# Patient Record
Sex: MALE | Race: OTHER | Employment: UNEMPLOYED | URBAN - METROPOLITAN AREA
[De-identification: names, ages, dates, MRNs, and addresses within clinical notes are randomized per-mention and may not be internally consistent; named-entity substitution may affect disease eponyms.]

---

## 2017-02-12 ENCOUNTER — HOSPITAL ENCOUNTER (EMERGENCY)
Facility: HOSPITAL | Age: 42
Discharge: HOME | End: 2017-02-12
Attending: EMERGENCY MEDICINE
Payer: MEDICAID

## 2017-02-12 ENCOUNTER — APPOINTMENT (OUTPATIENT)
Dept: CT IMAGING | Facility: HOSPITAL | Age: 42
End: 2017-02-12
Attending: EMERGENCY MEDICINE
Payer: MEDICAID

## 2017-02-12 VITALS
SYSTOLIC BLOOD PRESSURE: 121 MMHG | OXYGEN SATURATION: 99 % | RESPIRATION RATE: 17 BRPM | BODY MASS INDEX: 22.76 KG/M2 | WEIGHT: 145 LBS | HEIGHT: 67 IN | TEMPERATURE: 99 F | DIASTOLIC BLOOD PRESSURE: 76 MMHG | HEART RATE: 94 BPM

## 2017-02-12 DIAGNOSIS — S01.81XA FOREHEAD LACERATION, INITIAL ENCOUNTER: Primary | ICD-10-CM

## 2017-02-12 PROCEDURE — 70450 CT HEAD/BRAIN W/O DYE: CPT

## 2017-02-12 PROCEDURE — 99284 EMERGENCY DEPT VISIT MOD MDM: CPT

## 2017-02-12 PROCEDURE — 12014 RPR F/E/E/N/L/M 5.1-7.5 CM: CPT

## 2017-02-12 RX ORDER — ACETAMINOPHEN 325 MG/1
650 TABLET ORAL ONCE
Status: COMPLETED | OUTPATIENT
Start: 2017-02-12 | End: 2017-02-12

## 2017-02-12 NOTE — ED NOTES
Pt alert and oriented answering all questions appropriately.  Pt updated on plan of care regarding awaiting Ct results
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
## 2017-02-12 NOTE — ED NOTES
Bacitracin, non adaptic and gauze head wrap applied to patient forehead. Patient awaiting ride from friend.  Pt walks with steady gait, denies dizziness

## 2017-09-13 ENCOUNTER — OFFICE VISIT (OUTPATIENT)
Dept: FAMILY MEDICINE CLINIC | Facility: CLINIC | Age: 42
End: 2017-09-13

## 2017-09-13 ENCOUNTER — HOSPITAL ENCOUNTER (OUTPATIENT)
Age: 42
Discharge: HOME | End: 2017-09-13
Payer: MEDICAID

## 2017-09-13 VITALS
OXYGEN SATURATION: 98 % | HEART RATE: 108 BPM | BODY MASS INDEX: 27.47 KG/M2 | HEIGHT: 67 IN | RESPIRATION RATE: 18 BRPM | SYSTOLIC BLOOD PRESSURE: 124 MMHG | WEIGHT: 175 LBS | DIASTOLIC BLOOD PRESSURE: 70 MMHG | TEMPERATURE: 98 F

## 2017-09-13 VITALS
RESPIRATION RATE: 16 BRPM | SYSTOLIC BLOOD PRESSURE: 134 MMHG | DIASTOLIC BLOOD PRESSURE: 88 MMHG | HEART RATE: 94 BPM | OXYGEN SATURATION: 99 %

## 2017-09-13 DIAGNOSIS — H10.32 ACUTE CONJUNCTIVITIS OF LEFT EYE, UNSPECIFIED ACUTE CONJUNCTIVITIS TYPE: ICD-10-CM

## 2017-09-13 DIAGNOSIS — H02.846 EDEMA OF LEFT EYELID: ICD-10-CM

## 2017-09-13 DIAGNOSIS — Z02.9 ENCOUNTERS FOR ADMINISTRATIVE PURPOSE: Primary | ICD-10-CM

## 2017-09-13 DIAGNOSIS — H57.12 ACUTE LEFT EYE PAIN: Primary | ICD-10-CM

## 2017-09-13 PROCEDURE — 99213 OFFICE O/P EST LOW 20 MIN: CPT

## 2017-09-13 PROCEDURE — 99214 OFFICE O/P EST MOD 30 MIN: CPT

## 2017-09-13 RX ORDER — TETRACAINE HYDROCHLORIDE 5 MG/ML
2 SOLUTION OPHTHALMIC ONCE
Status: COMPLETED | OUTPATIENT
Start: 2017-09-13 | End: 2017-09-13

## 2017-09-13 RX ORDER — OLOPATADINE HYDROCHLORIDE 1 MG/ML
1 SOLUTION/ DROPS OPHTHALMIC 2 TIMES DAILY
Qty: 1 BOTTLE | Refills: 0 | Status: SHIPPED | OUTPATIENT
Start: 2017-09-13 | End: 2017-09-20

## 2017-09-13 RX ORDER — CIPROFLOXACIN HYDROCHLORIDE 3.5 MG/ML
2 SOLUTION/ DROPS TOPICAL
Qty: 1 BOTTLE | Refills: 0 | Status: SHIPPED | OUTPATIENT
Start: 2017-09-13 | End: 2017-09-23

## 2017-09-13 NOTE — PROGRESS NOTES
Pt presents to [facility] for left eye pain. Pt states it started about 2 days ago. Pt states \"it feels swollen and hurts. \" pt states is unsure if something is in his eye. I informed pt that this is beyond my scope in [facility].  I attempted to get pt in with Dr. Yoshi Westbrook

## 2017-09-13 NOTE — ED PROVIDER NOTES
Patient Seen in: [address] [facility]    History   Patient presents with:  Eye Problem    Stated Complaint: flash of light when blinking/left eye pain swelling and red    HPI    Chio Webber is a 41yr old male who comes in from the [facility] complaining of Appearance: Alert, cooperative, no SOB or labored breathing, appropriate for age   Head: Normocephalic, without obvious abnormality   Eyes: EOMI without nystagmus. PERRLA. Left eye: mild conjunctival injection. no matting. No obvious foreign body.  No surro these medications    ciprofloxacin HCl 0.3 % Ophthalmic Solution  Place 2 drops into the left eye every 4 (four) hours while awake. Qty: 1 Bottle Refills: 0    Olopatadine HCl 0.1 % Ophthalmic Solution  Place 1 drop into the left eye 2 (two) times daily.

## 2017-09-13 NOTE — ED INITIAL ASSESSMENT (HPI)
Pt sts redness and swelling to left upper eyelid and constant throbbing pain to left eye for the past 2 days. Unable to focus clearly. Constant presence of a flashing white [place] in vision when eye is closed.  Swelling had increased this morning but has baca

## 2017-09-29 ENCOUNTER — HOSPITAL ENCOUNTER (OUTPATIENT)
Age: 42
Discharge: HOME | End: 2017-09-29
Payer: MEDICAID

## 2017-09-29 ENCOUNTER — OFFICE VISIT (OUTPATIENT)
Dept: FAMILY MEDICINE CLINIC | Facility: CLINIC | Age: 42
End: 2017-09-29

## 2017-09-29 VITALS
RESPIRATION RATE: 16 BRPM | HEART RATE: 80 BPM | HEIGHT: 66 IN | SYSTOLIC BLOOD PRESSURE: 128 MMHG | DIASTOLIC BLOOD PRESSURE: 85 MMHG | TEMPERATURE: 98 F | OXYGEN SATURATION: 99 % | BODY MASS INDEX: 28.93 KG/M2 | WEIGHT: 180 LBS

## 2017-09-29 DIAGNOSIS — L05.91 CYST NEAR COCCYX: Primary | ICD-10-CM

## 2017-09-29 DIAGNOSIS — L05.01 PILONIDAL CYST WITH ABSCESS: Primary | ICD-10-CM

## 2017-09-29 PROCEDURE — 99213 OFFICE O/P EST LOW 20 MIN: CPT

## 2017-09-29 PROCEDURE — 10080 I&D PILONIDAL CYST SIMPLE: CPT

## 2017-09-29 PROCEDURE — 99214 OFFICE O/P EST MOD 30 MIN: CPT

## 2017-09-29 RX ORDER — SULFAMETHOXAZOLE AND TRIMETHOPRIM 800; 160 MG/1; MG/1
1 TABLET ORAL 2 TIMES DAILY
Qty: 14 TABLET | Refills: 0 | Status: SHIPPED | OUTPATIENT
Start: 2017-09-29 | End: 2017-10-06

## 2017-09-29 NOTE — ED PROVIDER NOTES
Patient Seen in: [address]    History   Patient presents with:  Bump    Stated Complaint: was sent from walkin clinic -cyst     HPI    Patient is a pleasant 82-year-old male.   Patient has a pertinent medical history for stage I HPI.  Constitutional and vital signs reviewed. All other systems reviewed and negative except as noted above. PSFH elements reviewed from today and agreed except as otherwise stated in HPI.     Physical Exam   ED Triage Vitals [09/29/17 1456]  BP: [address] [address]  [phone]            Medications Prescribed:  Current Discharge Medication List    START taking these medications    Sulfamethoxazole-TMP -160 MG Oral Tab per tablet  Take 1 tablet by mouth 2 (two) times daily.   Qty: 14 tablet Refills: 0

## 2017-09-29 NOTE — ED INITIAL ASSESSMENT (HPI)
Bump between buttocks x 2 days. Denies fever. Painful. Pt has h/o abscess 2 yrs ago.  Took ibuprofen 2 tab 11 am.

## 2017-09-29 NOTE — PROGRESS NOTES
Pt presents to [facility] for painful bump above tailbone.   Pt c/o pain with sitting, walking  Pt referred to [facility] to further eval and treat possible pilonidal cyst  Attempted to contact [facility] to inform of transfer, no answer  Pt is stable upon d/c from [place]

## 2017-09-30 ENCOUNTER — HOSPITAL ENCOUNTER (EMERGENCY)
Facility: HOSPITAL | Age: 42
Discharge: HOME | End: 2017-09-30
Attending: EMERGENCY MEDICINE
Payer: MEDICAID

## 2017-09-30 VITALS
SYSTOLIC BLOOD PRESSURE: 113 MMHG | HEART RATE: 75 BPM | HEIGHT: 66 IN | TEMPERATURE: 98 F | BODY MASS INDEX: 28.93 KG/M2 | OXYGEN SATURATION: 100 % | DIASTOLIC BLOOD PRESSURE: 89 MMHG | WEIGHT: 180 LBS | RESPIRATION RATE: 18 BRPM

## 2017-09-30 DIAGNOSIS — L05.01 PILONIDAL CYST WITH ABSCESS: Primary | ICD-10-CM

## 2017-09-30 PROCEDURE — 10061 I&D ABSCESS COMP/MULTIPLE: CPT

## 2017-09-30 PROCEDURE — 10081 I&D PILONIDAL CYST COMP: CPT

## 2017-09-30 PROCEDURE — 99283 EMERGENCY DEPT VISIT LOW MDM: CPT

## 2017-09-30 PROCEDURE — 96372 THER/PROPH/DIAG INJ SC/IM: CPT

## 2017-09-30 RX ORDER — KETOROLAC TROMETHAMINE 30 MG/ML
30 INJECTION, SOLUTION INTRAMUSCULAR; INTRAVENOUS ONCE
Status: COMPLETED | OUTPATIENT
Start: 2017-09-30 | End: 2017-09-30

## 2017-09-30 RX ORDER — HYDROCODONE BITARTRATE AND ACETAMINOPHEN 5; 325 MG/1; MG/1
1-2 TABLET ORAL EVERY 4 HOURS PRN
Qty: 20 TABLET | Refills: 0 | Status: SHIPPED | OUTPATIENT
Start: 2017-09-30 | End: 2017-10-07

## 2017-09-30 NOTE — ED PROVIDER NOTES
Patient Seen in: [facility] Emergency Department    History   Patient presents with:  Abscess (integumentary)    Stated Complaint: abscess     HPI    79-year-old male who was seen in the immediate care center yesterday and had a pilonidal abscess  patient has a 1.5 cm abscess that is fluctuant and tender  Extremities: no edema, normal peripheral pulses   Neuro: Alert oriented and nonfocal     ED Course   Labs Reviewed - No data to display    ==========================================================

## 2017-09-30 NOTE — ED INITIAL ASSESSMENT (HPI)
Here for a re-evaluation of a pilonidal cyst. Patient said he was seen at an immediate care center yesterday. Had the cyst lanced. Today, patient said the cyst got bigger, but is now normal after he showered today. Denies fevers.

## 2017-10-02 ENCOUNTER — APPOINTMENT (OUTPATIENT)
Dept: ULTRASOUND IMAGING | Facility: HOSPITAL | Age: 42
End: 2017-10-02
Attending: EMERGENCY MEDICINE
Payer: MEDICAID

## 2017-10-02 ENCOUNTER — HOSPITAL ENCOUNTER (EMERGENCY)
Facility: HOSPITAL | Age: 42
Discharge: HOME | End: 2017-10-03
Attending: EMERGENCY MEDICINE
Payer: MEDICAID

## 2017-10-02 VITALS
DIASTOLIC BLOOD PRESSURE: 74 MMHG | HEIGHT: 66 IN | TEMPERATURE: 98 F | BODY MASS INDEX: 28.93 KG/M2 | WEIGHT: 180 LBS | OXYGEN SATURATION: 99 % | HEART RATE: 75 BPM | RESPIRATION RATE: 16 BRPM | SYSTOLIC BLOOD PRESSURE: 128 MMHG

## 2017-10-02 DIAGNOSIS — L05.01 PILONIDAL CYST WITH ABSCESS: Primary | ICD-10-CM

## 2017-10-02 PROCEDURE — 76705 ECHO EXAM OF ABDOMEN: CPT | Performed by: EMERGENCY MEDICINE

## 2017-10-02 PROCEDURE — 99285 EMERGENCY DEPT VISIT HI MDM: CPT

## 2017-10-02 PROCEDURE — 10081 I&D PILONIDAL CYST COMP: CPT

## 2017-10-02 PROCEDURE — 99284 EMERGENCY DEPT VISIT MOD MDM: CPT

## 2017-10-02 PROCEDURE — 96372 THER/PROPH/DIAG INJ SC/IM: CPT

## 2017-10-02 RX ORDER — KETOROLAC TROMETHAMINE 30 MG/ML
30 INJECTION, SOLUTION INTRAMUSCULAR; INTRAVENOUS ONCE
Status: COMPLETED | OUTPATIENT
Start: 2017-10-02 | End: 2017-10-02

## 2017-10-02 RX ORDER — IBUPROFEN 800 MG/1
600 TABLET ORAL EVERY 6 HOURS PRN
COMMUNITY

## 2017-10-03 RX ORDER — HYDROCODONE BITARTRATE AND ACETAMINOPHEN 5; 325 MG/1; MG/1
1-2 TABLET ORAL EVERY 4 HOURS PRN
Qty: 12 TABLET | Refills: 0 | Status: SHIPPED | OUTPATIENT
Start: 2017-10-03 | End: 2018-10-17 | Stop reason: ALTCHOICE

## 2017-10-03 NOTE — ED INITIAL ASSESSMENT (HPI)
Pt had pilonidal cyst drained at [facility] on Friday, seen here on Saturday for additional I&D; pt here with more drainage and bleeding

## 2017-10-03 NOTE — ED PROVIDER NOTES
Patient Seen in: [facility] Emergency Department    History   Patient presents with:  Abscess (integumentary)    Stated Complaint: PILONIDAL CYST    CHARU Patterson is a 42-year-old male who presents today for evaluation of continued pain with a pilonid Packs/day: 0.50      Years: 0.00         Types: Cigarettes  Smokeless tobacco: Never Used                          Review of Systems    Positive for stated complaint: PILONIDAL CYST  Other systems are as noted in HPI.   Constitutiona (primary encounter diagnosis)    Disposition:  There is no disposition on file for this visit. Follow-up:  No follow-up provider specified.     Medications Prescribed:  Current Discharge Medication List

## 2018-04-23 ENCOUNTER — HOSPITAL ENCOUNTER (EMERGENCY)
Facility: HOSPITAL | Age: 43
Discharge: HOME | End: 2018-04-23
Attending: EMERGENCY MEDICINE
Payer: MEDICAID

## 2018-04-23 VITALS
HEIGHT: 65 IN | SYSTOLIC BLOOD PRESSURE: 131 MMHG | TEMPERATURE: 98 F | RESPIRATION RATE: 18 BRPM | WEIGHT: 215 LBS | DIASTOLIC BLOOD PRESSURE: 78 MMHG | OXYGEN SATURATION: 98 % | HEART RATE: 116 BPM | BODY MASS INDEX: 35.82 KG/M2

## 2018-04-23 DIAGNOSIS — S61.214A LACERATION OF RIGHT RING FINGER WITHOUT FOREIGN BODY, NAIL DAMAGE STATUS UNSPECIFIED, INITIAL ENCOUNTER: Primary | ICD-10-CM

## 2018-04-23 PROCEDURE — 12001 RPR S/N/AX/GEN/TRNK 2.5CM/<: CPT

## 2018-04-23 PROCEDURE — 99283 EMERGENCY DEPT VISIT LOW MDM: CPT

## 2018-04-23 PROCEDURE — 90471 IMMUNIZATION ADMIN: CPT

## 2018-04-23 NOTE — ED PROVIDER NOTES
Patient Seen in: [facility] Emergency Department    History   Patient presents with:  Laceration Abrasion (integumentary)    Stated Complaint: FINGER LAC    HPI    Patient is a 53-year-old male comes in emergency room for evaluation of right fourth noted.  No bleeding or discharge    ED Course   Labs Reviewed - No data to display    ED Course as of Apr 23 0531  ------------------------------------------------------------  Patient underwent wound a sepsis. Patient had Dermaflex placed.   Tetanus shot
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
## 2018-10-17 ENCOUNTER — HOSPITAL ENCOUNTER (EMERGENCY)
Facility: HOSPITAL | Age: 43
Discharge: HOME | End: 2018-10-17
Attending: EMERGENCY MEDICINE
Payer: MEDICAID

## 2018-10-17 VITALS
DIASTOLIC BLOOD PRESSURE: 95 MMHG | WEIGHT: 210 LBS | OXYGEN SATURATION: 100 % | SYSTOLIC BLOOD PRESSURE: 112 MMHG | HEART RATE: 95 BPM | RESPIRATION RATE: 17 BRPM | HEIGHT: 66 IN | TEMPERATURE: 98 F | BODY MASS INDEX: 33.75 KG/M2

## 2018-10-17 DIAGNOSIS — L05.01 PILONIDAL CYST WITH ABSCESS: Primary | ICD-10-CM

## 2018-10-17 PROCEDURE — 10061 I&D ABSCESS COMP/MULTIPLE: CPT

## 2018-10-17 PROCEDURE — 99283 EMERGENCY DEPT VISIT LOW MDM: CPT

## 2018-10-17 PROCEDURE — 87205 SMEAR GRAM STAIN: CPT | Performed by: NURSE PRACTITIONER

## 2018-10-17 PROCEDURE — 87070 CULTURE OTHR SPECIMN AEROBIC: CPT | Performed by: NURSE PRACTITIONER

## 2018-10-17 RX ORDER — IBUPROFEN 600 MG/1
600 TABLET ORAL EVERY 8 HOURS PRN
Qty: 30 TABLET | Refills: 0 | Status: SHIPPED | OUTPATIENT
Start: 2018-10-17 | End: 2018-10-24

## 2018-10-17 RX ORDER — HYDROCODONE BITARTRATE AND ACETAMINOPHEN 5; 325 MG/1; MG/1
2 TABLET ORAL ONCE
Status: COMPLETED | OUTPATIENT
Start: 2018-10-17 | End: 2018-10-17

## 2018-10-17 NOTE — ED INITIAL ASSESSMENT (HPI)
Pt c/o possible pilonidal cyst - reports was working on his car and started have pain by his beltline

## 2018-10-18 NOTE — ED PROVIDER NOTES
Patient Seen in: [facility] Emergency Department    History   Patient presents with:  Abscess (integumentary)    Stated Complaint: abscess     HPI  Patient is a 44-year old gentleman who presents with pilonidal cyst.  Patient fell down 3 wooden step Mouth/Throat: Oropharynx is clear and moist.   Eyes: Conjunctivae are normal.   Pulmonary/Chest: Effort normal.   Abdominal: Soft. Bowel sounds are normal. He exhibits no distension and no mass. There is no tenderness. There is no rebound and no guarding. Discussed with patient that he needs to apply warm compresses to this area to utilize sitz baths.   Also advised patient to leave packing in place and to have follow-up in 2 days for packing removal.  Discussed with patient that he needs to observe wound ar

## 2018-10-20 ENCOUNTER — HOSPITAL ENCOUNTER (EMERGENCY)
Facility: HOSPITAL | Age: 43
Discharge: HOME | End: 2018-10-20
Attending: EMERGENCY MEDICINE
Payer: MEDICAID

## 2018-10-20 VITALS
BODY MASS INDEX: 22.49 KG/M2 | HEIGHT: 65 IN | DIASTOLIC BLOOD PRESSURE: 90 MMHG | WEIGHT: 135 LBS | OXYGEN SATURATION: 99 % | RESPIRATION RATE: 18 BRPM | SYSTOLIC BLOOD PRESSURE: 135 MMHG | TEMPERATURE: 98 F | HEART RATE: 88 BPM

## 2018-10-20 DIAGNOSIS — L03.317 CELLULITIS OF BUTTOCK: ICD-10-CM

## 2018-10-20 DIAGNOSIS — L05.01 PILONIDAL CYST WITH ABSCESS: Primary | ICD-10-CM

## 2018-10-20 PROCEDURE — 99283 EMERGENCY DEPT VISIT LOW MDM: CPT

## 2018-10-20 RX ORDER — SULFAMETHOXAZOLE AND TRIMETHOPRIM 800; 160 MG/1; MG/1
1 TABLET ORAL 2 TIMES DAILY
Qty: 14 TABLET | Refills: 0 | Status: SHIPPED | OUTPATIENT
Start: 2018-10-20 | End: 2018-10-27

## 2018-10-20 RX ORDER — IBUPROFEN 600 MG/1
600 TABLET ORAL EVERY 8 HOURS PRN
Qty: 30 TABLET | Refills: 0 | Status: SHIPPED | OUTPATIENT
Start: 2018-10-20 | End: 2018-10-27

## 2018-10-20 NOTE — ED PROVIDER NOTES
Patient Seen in: [facility] Emergency Department    History   Patient presents with:  Cellulitis (integumentary, infectious)    Stated Complaint:     HPI    44-year-old male presents emergency room for wound check.   Patient had incision and drainage Regular rate and rhythm, no murmurs. LUNGS: Clear to auscultation bilaterally. No Rales, no rhonchi, no wheezing, no stridor.   ABDOMEN: Soft, nondistended,non tender  Buttock: Pilonidal abscess status post drainage with central incision without fluctuanc DO  [address]    Schedule an appointment as soon as possible for a visit in 2 days          Medications Prescribed:  Current Discharge Medication List    START taking these medications    Sulfametho

## 2019-05-28 ENCOUNTER — APPOINTMENT (OUTPATIENT)
Dept: CT IMAGING | Facility: HOSPITAL | Age: 44
End: 2019-05-28
Attending: EMERGENCY MEDICINE
Payer: MEDICAID

## 2019-05-28 ENCOUNTER — HOSPITAL ENCOUNTER (EMERGENCY)
Facility: HOSPITAL | Age: 44
Discharge: HOME | End: 2019-05-28
Attending: EMERGENCY MEDICINE
Payer: MEDICAID

## 2019-05-28 VITALS
DIASTOLIC BLOOD PRESSURE: 88 MMHG | OXYGEN SATURATION: 98 % | HEIGHT: 66 IN | WEIGHT: 176 LBS | TEMPERATURE: 98 F | SYSTOLIC BLOOD PRESSURE: 120 MMHG | HEART RATE: 80 BPM | RESPIRATION RATE: 16 BRPM | BODY MASS INDEX: 28.28 KG/M2

## 2019-05-28 DIAGNOSIS — S31.109S CHRONIC ABDOMINAL WOUND INFECTION, SEQUELA: Primary | ICD-10-CM

## 2019-05-28 DIAGNOSIS — L08.9 CHRONIC ABDOMINAL WOUND INFECTION, SEQUELA: Primary | ICD-10-CM

## 2019-05-28 PROCEDURE — 87070 CULTURE OTHR SPECIMN AEROBIC: CPT | Performed by: EMERGENCY MEDICINE

## 2019-05-28 PROCEDURE — 80053 COMPREHEN METABOLIC PANEL: CPT | Performed by: EMERGENCY MEDICINE

## 2019-05-28 PROCEDURE — 87205 SMEAR GRAM STAIN: CPT | Performed by: EMERGENCY MEDICINE

## 2019-05-28 PROCEDURE — 99284 EMERGENCY DEPT VISIT MOD MDM: CPT

## 2019-05-28 PROCEDURE — 74177 CT ABD & PELVIS W/CONTRAST: CPT | Performed by: EMERGENCY MEDICINE

## 2019-05-28 PROCEDURE — 96361 HYDRATE IV INFUSION ADD-ON: CPT

## 2019-05-28 PROCEDURE — 96360 HYDRATION IV INFUSION INIT: CPT

## 2019-05-28 PROCEDURE — 87186 SC STD MICRODIL/AGAR DIL: CPT | Performed by: EMERGENCY MEDICINE

## 2019-05-28 PROCEDURE — 85025 COMPLETE CBC W/AUTO DIFF WBC: CPT | Performed by: EMERGENCY MEDICINE

## 2019-05-28 PROCEDURE — 87077 CULTURE AEROBIC IDENTIFY: CPT | Performed by: EMERGENCY MEDICINE

## 2019-05-28 RX ORDER — CYCLOBENZAPRINE HCL 10 MG
10 TABLET ORAL 3 TIMES DAILY PRN
COMMUNITY

## 2019-05-28 RX ORDER — SODIUM CHLORIDE 9 MG/ML
INJECTION, SOLUTION INTRAVENOUS ONCE
Status: COMPLETED | OUTPATIENT
Start: 2019-05-28 | End: 2019-05-28

## 2019-05-28 RX ORDER — DOXYCYCLINE HYCLATE 100 MG/1
100 CAPSULE ORAL 2 TIMES DAILY
COMMUNITY

## 2019-05-28 RX ORDER — TRAMADOL HYDROCHLORIDE 50 MG/1
TABLET ORAL EVERY 6 HOURS PRN
Qty: 15 TABLET | Refills: 0 | Status: SHIPPED | OUTPATIENT
Start: 2019-05-28

## 2019-05-28 NOTE — ED PROVIDER NOTES
Patient Seen in: [facility] Emergency Department    History   Patient presents with:  Abdomen/Flank Pain (GI/)    Stated Complaint: abdominal pain, recent surgery with wound draining    HPI    Patient is a 57-year-old male, presenting for evaluati Current:/74   Pulse 87   Temp 98 °F (36.7 °C) (Temporal)   Resp 20   Ht 167.6 cm (5' 6\")   Wt 79.8 kg   SpO2 96%   BMI 28.41 kg/m²       Physical Exam   Constitutional: He is oriented to person, place, and time.  He appears well-developed and well-no PROCEDURE:  CT ABDOMEN PELVIS IV CONTRAST, NO ORAL (ER)  COMPARISON:  [facility] , CT ABDOMEN PELVIS IV CONTRAST, NO ORAL (ER), 8/05/2016, 0:35.   INDICATIONS:  abdominal pain, recent surgery with wound draining  TECHNIQUE:  CT scanning was performed from the d CONCLUSION:  Postsurgical changes noted within the anterior abdominal wall from prior hernia repair surgery with soft tissue thickening and a small amount of fluid noted measuring 2.2 x 0.5 x 1. 6 cm likely a postoperative seroma although infectious etiolo

## 2019-05-28 NOTE — ED INITIAL ASSESSMENT (HPI)
Repair of abdominal mesh 3/20 with hernias. 3/25 had additional repair. Abscess developed post op. Open area noted 5/22. C/O ongoing pain to incision site, upper abdomen and it radiates into right flank.   Pt also reports that he fell onto his right aimee

## 2021-05-08 ENCOUNTER — HOSPITAL ENCOUNTER (EMERGENCY)
Facility: HOSPITAL | Age: 46
Discharge: HOME | End: 2021-05-08
Attending: EMERGENCY MEDICINE
Payer: MEDICAID

## 2021-05-08 VITALS
RESPIRATION RATE: 18 BRPM | DIASTOLIC BLOOD PRESSURE: 96 MMHG | HEART RATE: 100 BPM | OXYGEN SATURATION: 97 % | WEIGHT: 178 LBS | TEMPERATURE: 99 F | HEIGHT: 66 IN | SYSTOLIC BLOOD PRESSURE: 131 MMHG | BODY MASS INDEX: 28.61 KG/M2

## 2021-05-08 DIAGNOSIS — T50.B95A ADVERSE REACTION TO COVID-19 VACCINE: ICD-10-CM
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
## 2021-05-08 PROCEDURE — 99283 EMERGENCY DEPT VISIT LOW MDM: CPT

## 2021-05-08 RX ORDER — ONDANSETRON 4 MG/1
4 TABLET, ORALLY DISINTEGRATING ORAL EVERY 4 HOURS PRN
Qty: 10 TABLET | Refills: 0 | Status: SHIPPED | OUTPATIENT
Start: 2021-05-08 | End: 2021-05-15

## 2021-05-08 NOTE — ED PROVIDER NOTES
Patient Seen in: [facility] Emergency Department      History   Patient presents with:   Allergic Rxn Allergies    Stated Complaint: itching    HPI/Subjective:   HPI    Patient is a 26-year-old gentleman who presents the emergency room with several c Current:BP (!) 131/96   Pulse 100   Temp 98.7 °F (37.1 °C) (Temporal)   Resp 18   Ht 167.6 cm (5' 6\")   Wt 80.7 kg   SpO2 97%   BMI 28.73 kg/m²         Physical Exam    General: Well-appearing patient of stated age resting comfortably  HEENT: Norm[place]

## 2021-05-24 NOTE — ED INITIAL ASSESSMENT (HPI)
Patient was seen here on the 17th of October for a pilonidal cyst he went home instructed to take ibuprofen.  Returns today because the abscess is red itching and oozing blood None

## 2022-11-16 ENCOUNTER — OFFICE VISIT (OUTPATIENT)
Dept: NEUROLOGY | Facility: CLINIC | Age: 47
End: 2022-11-16
Payer: MEDICAID

## 2022-11-16 ENCOUNTER — TELEPHONE (OUTPATIENT)
Dept: NEUROLOGY | Facility: CLINIC | Age: 47
End: 2022-11-16

## 2022-11-16 ENCOUNTER — TELEPHONE (OUTPATIENT)
Dept: SURGERY | Facility: CLINIC | Age: 47
End: 2022-11-16

## 2022-11-16 VITALS
BODY MASS INDEX: 29 KG/M2 | HEART RATE: 82 BPM | DIASTOLIC BLOOD PRESSURE: 72 MMHG | SYSTOLIC BLOOD PRESSURE: 130 MMHG | WEIGHT: 178 LBS | RESPIRATION RATE: 16 BRPM

## 2022-11-16 DIAGNOSIS — G35 MS (MULTIPLE SCLEROSIS) (HCC): Primary | ICD-10-CM

## 2022-11-16 DIAGNOSIS — R26.0 ATAXIC GAIT: ICD-10-CM

## 2022-11-16 DIAGNOSIS — R20.0 NUMBNESS AND TINGLING OF BOTH LOWER EXTREMITIES: ICD-10-CM

## 2022-11-16 DIAGNOSIS — R20.2 NUMBNESS AND TINGLING OF BOTH UPPER EXTREMITIES: ICD-10-CM

## 2022-11-16 DIAGNOSIS — R20.2 NUMBNESS AND TINGLING OF BOTH LOWER EXTREMITIES: ICD-10-CM

## 2022-11-16 DIAGNOSIS — R20.0 NUMBNESS AND TINGLING OF BOTH UPPER EXTREMITIES: ICD-10-CM

## 2022-11-16 PROCEDURE — 99204 OFFICE O/P NEW MOD 45 MIN: CPT | Performed by: OTHER

## 2022-11-16 PROCEDURE — 3075F SYST BP GE 130 - 139MM HG: CPT | Performed by: OTHER

## 2022-11-16 PROCEDURE — 3078F DIAST BP <80 MM HG: CPT | Performed by: OTHER

## 2022-11-16 RX ORDER — IBUPROFEN 400 MG/1
TABLET ORAL
COMMUNITY
Start: 2022-07-28

## 2022-11-16 RX ORDER — ALBUTEROL SULFATE 90 UG/1
2 AEROSOL, METERED RESPIRATORY (INHALATION)
COMMUNITY
Start: 2022-09-03

## 2022-11-16 RX ORDER — GABAPENTIN 300 MG/1
1 CAPSULE ORAL 3 TIMES DAILY
COMMUNITY
Start: 2022-08-09

## 2022-11-16 RX ORDER — ESOMEPRAZOLE MAGNESIUM 40 MG/1
40 CAPSULE, DELAYED RELEASE ORAL DAILY
COMMUNITY

## 2022-11-16 NOTE — TELEPHONE ENCOUNTER
Pt brought in 3 imaging disc w/ MRI; uploaded to edw pacs and returned to pt;informed RN Arpit Anaya imaging was uploaded so that  can be aware

## 2022-11-16 NOTE — PROGRESS NOTES
Patient states BUE numbness and tingling. Patient states increase in weakness in the BLE, patient states he fell about 12 hours ago. Patient states increase in fatigue. Patient states about 2 headaches daily. Denies changes in vision or speech.

## 2022-11-16 NOTE — TELEPHONE ENCOUNTER
[address] [place] [address] [phone] and spoke with Usha BOLANOS In the prior authorization department. CPT codes  and 29136 are valid and billable no authorization is required. Call reference #[id number]. ,

## 2022-11-17 ENCOUNTER — OFFICE VISIT (OUTPATIENT)
Dept: HEMATOLOGY/ONCOLOGY | Facility: HOSPITAL | Age: 47
End: 2022-11-17
Attending: Other
Payer: MEDICAID

## 2022-11-17 VITALS
HEART RATE: 86 BPM | RESPIRATION RATE: 18 BRPM | OXYGEN SATURATION: 97 % | SYSTOLIC BLOOD PRESSURE: 126 MMHG | TEMPERATURE: 98 F | DIASTOLIC BLOOD PRESSURE: 84 MMHG

## 2022-11-17 DIAGNOSIS — G35 MS (MULTIPLE SCLEROSIS) (HCC): Primary | ICD-10-CM

## 2022-11-17 LAB — VIT D+METAB SERPL-MCNC: 7.6 NG/ML (ref 30–100)

## 2022-11-17 PROCEDURE — 96365 THER/PROPH/DIAG IV INF INIT: CPT

## 2022-11-17 PROCEDURE — 82306 VITAMIN D 25 HYDROXY: CPT

## 2022-11-17 NOTE — TELEPHONE ENCOUNTER
Praful Rosario  approval #[id number] from 11/17/22-11/17/23 from [place]. Called [facility] regarding CPT code 39451. Per Aron Salazar no authorization is required for outpatient.  Call reference #[id number]

## 2022-11-17 NOTE — PROGRESS NOTES
Education Record    Learner:  Patient    Disease / Diagnosis: MS, 1g solumedrol infusion    Barriers / Limitations:  None   Comments:    Method:  Discussion   Comments:    General Topics:  Plan of care reviewed   Comments:    Outcome:  Shows understanding   Comments:

## 2022-11-18 ENCOUNTER — OFFICE VISIT (OUTPATIENT)
Dept: HEMATOLOGY/ONCOLOGY | Facility: HOSPITAL | Age: 47
End: 2022-11-18
Attending: Other
Payer: MEDICAID

## 2022-11-18 VITALS
HEART RATE: 116 BPM | SYSTOLIC BLOOD PRESSURE: 109 MMHG | TEMPERATURE: 99 F | RESPIRATION RATE: 18 BRPM | OXYGEN SATURATION: 94 % | DIASTOLIC BLOOD PRESSURE: 67 MMHG

## 2022-11-18 DIAGNOSIS — G35 MS (MULTIPLE SCLEROSIS) (HCC): Primary | ICD-10-CM

## 2022-11-18 PROCEDURE — 96365 THER/PROPH/DIAG IV INF INIT: CPT

## 2022-11-18 PROCEDURE — 96374 THER/PROPH/DIAG INJ IV PUSH: CPT

## 2022-11-18 NOTE — TELEPHONE ENCOUNTER
Before starting Tysabri patient will have to wait 2 weeks after finishing any medication.  Patient will also need his JCV drawn

## 2022-11-18 NOTE — PROGRESS NOTES
Education Record    Learner:  Patient    Disease / Diagnosis: Pt here for solumedrol infusion     Barriers / Limitations:  None    Method:  Brief focused, printed material and  reinforcement    General Topics:  Plan of care reviewed    Outcome:  Shows understanding    Pt reported slipping and having a small fall this am trying to get his leg in the pants. Pt reported he bumped his head and hit his right leg. RN asked pt if he was having any headaches, blurry vision, or intense pain. Pt denied. Pt stated he just felt a little numbness in hands. Pt was asked if he wanted to be evaluated for further testing and or scans. Pt denied this. Pt instructed to go to the ER or immediate care if he feels new symptoms or his condition gets worse. Pt tolerated infusion. Pt left IV in place for tomorrow's treatment. Pt left in stable condition.

## 2022-11-19 ENCOUNTER — OFFICE VISIT (OUTPATIENT)
Dept: HEMATOLOGY/ONCOLOGY | Facility: HOSPITAL | Age: 47
End: 2022-11-19
Attending: Other
Payer: MEDICAID

## 2022-11-19 VITALS
RESPIRATION RATE: 18 BRPM | TEMPERATURE: 98 F | HEART RATE: 84 BPM | DIASTOLIC BLOOD PRESSURE: 71 MMHG | OXYGEN SATURATION: 97 % | SYSTOLIC BLOOD PRESSURE: 123 MMHG

## 2022-11-19 DIAGNOSIS — G35 MS (MULTIPLE SCLEROSIS) (HCC): Primary | ICD-10-CM

## 2022-11-19 PROCEDURE — 96365 THER/PROPH/DIAG IV INF INIT: CPT

## 2022-11-19 NOTE — PROGRESS NOTES
Education Record    Learner:  Patient    Disease / Diagnosis: solumedrol for MS    Barriers / Limitations:  None    Method:  Brief focused, printed material and  reinforcement    General Topics:  Plan of care reviewed    Outcome:  Shows understanding

## 2022-11-19 NOTE — PROGRESS NOTES
Education Record    Learner:  Patient    Disease / Diagnosis:MS (multiple sclerosis)     Barriers / Limitations:  None   Comments:    Method:  Brief focused   Comments:    General Topics:  Infection, Medication, Pain, Precautions, Procedure, Side effects and symptom management, Plan of care reviewed and Fall risk and prevention   Comments:    Outcome:  Shows understanding   Comments: Tolerated well.  Given AVS

## 2022-11-25 ENCOUNTER — TELEPHONE (OUTPATIENT)
Dept: NEUROLOGY | Facility: CLINIC | Age: 47
End: 2022-11-25

## 2022-11-25 DIAGNOSIS — G35 MS (MULTIPLE SCLEROSIS) (HCC): Primary | ICD-10-CM

## 2022-11-25 NOTE — TELEPHONE ENCOUNTER
Pts mother calling to let Dr. Gbay Lopez know that since his three infusions last week that he has had no improvement - no feeling in his hands and he still has an unsteady gait. Pts mother is not on HIPAA form, let her know that office would only be able to speak to pt regarding condition.

## 2022-11-29 NOTE — TELEPHONE ENCOUNTER
RN Left message for patient to call office to have continued symptoms assessed as patient's mother (noted below) ended call prior to RN being able to assess patient.

## 2022-11-29 NOTE — TELEPHONE ENCOUNTER
Patient's mother called office (not on HIPPA), patient sitting next to patient's mother and gave verbal authorization to speak with patient's mother. Patient's mother began phone call in a very loud and elevated vocal tone noting that she called last week and did not receive a call back. RN attempted to inform patient's mother that a message was left on patient's phone on 11/25/2022 to call office back, but to date no return call has been made to office. Patient's mother began stating \"I have just had a root canal\". RN advised that patient has outstanding lab of JCV for Tysabri that was ordered on 11/16/2022 and need to be completed. Patient's mother spoke very loudly stating \"Why was I not told of this lab\", then mother stated \"Is this the lab that is suppose to go to [facility]?\". RN informed patient's mother that the JCV testing has been ordered to [address] [place] [address] and yes that it is the lab that needs to go to [facility] offered to send a copy of order through [address] and RN offered to send new link to establish MyChart. Patient's mother stated very forcefully that \"I don't know how to use MyChart\", RN then stated that the order should be able to be seen in in [facility]'s system, but to be sure the patient or mother could come by and get a hard copy of the order if they did not want to use MyChart or order could be faxed if they have the fax number. Patient's mother again stated, \"I just had a root canal and have to go back, I am NOT coming to the office. I should have been given this lab order at the office\". At this time, RN requested to speak patient x 2 and phone call abruptly ended.     RN reordered JCV to make sure lab order is current in system

## 2022-11-30 NOTE — TELEPHONE ENCOUNTER
Dipak Salinas from [employer] calling to have lab order faxed to [phone]. Confirmation rec'd.

## 2022-12-05 NOTE — TELEPHONE ENCOUNTER
03/21/18 2255   Vitals   Temp 100.1 °F (37.8 °C)   Temp Source Oral   Pulse (Heart Rate) (!) 117   Heart Rate Source Monitor   Resp Rate 18   O2 Sat (%) 97 %   Level of Consciousness Alert   /45   MAP (Calculated) (!) 64   BP 1 Location Left arm   BP 1 Method Automatic   BP Patient Position At rest   MEWS Score 3     Patient condition has not change, pt symptomatic. Will continue to monitor. Spoke with patient and patient's mother and informed the of Dr Kala Ward recommendations regarding Gabapentin. Patient verbalized understanding and patient's mother states JCV was done last week. Chart to come back to nurse pool on 12/7 to f/u on JCV results.

## 2022-12-06 LAB
INDEX VALUE: 4.06
JCV ANTIBODY: POSITIVE

## 2022-12-12 ENCOUNTER — TELEPHONE (OUTPATIENT)
Dept: NEUROLOGY | Facility: CLINIC | Age: 47
End: 2022-12-12

## 2022-12-12 DIAGNOSIS — G35 MS (MULTIPLE SCLEROSIS) (HCC): Primary | ICD-10-CM

## 2022-12-12 NOTE — TELEPHONE ENCOUNTER
RN attempted to call patient and Left VM to call office to discuss. Pt's mother is not on HIPPA, but has been calling on behalf of patient. RN reviewed chart and RN nursing supervisor reviewed chart and was unable to find mother's phone number. Original patient consent form and ALL lab orders sent to patient's home address on file via [address] [place] [address]. Lab orders were placed to [facility] per patient's last lab test requests. RN also faxed lab orders to [facility] with confirmation. IVX infusion paperwork and OCREVUS start form placed in [address] [place] [address] for continued completion once lab orders completed. SONU also [facility] to add his mother to his verbal release.

## 2022-12-12 NOTE — TELEPHONE ENCOUNTER
Due to patient's JCV status and current MS presentation, Dr. Ashwin Parks would like to order Sudha Urena for patient. Orders placed for patient to have lab work completed.

## 2022-12-13 ENCOUNTER — OFFICE VISIT (OUTPATIENT)
Dept: PHYSICAL THERAPY | Facility: HOSPITAL | Age: 47
End: 2022-12-13
Attending: Other
Payer: MEDICAID

## 2022-12-13 DIAGNOSIS — R26.0 ATAXIC GAIT: ICD-10-CM

## 2022-12-13 DIAGNOSIS — G35 MS (MULTIPLE SCLEROSIS) (HCC): ICD-10-CM

## 2022-12-13 PROCEDURE — 97116 GAIT TRAINING THERAPY: CPT

## 2022-12-13 PROCEDURE — 97162 PT EVAL MOD COMPLEX 30 MIN: CPT

## 2022-12-13 NOTE — TELEPHONE ENCOUNTER
Spoke to patients mother with verbal consent from patient. Chantelle Juarez started for the first 5 minutes regarding the lack of communication with her. Advise that we are not able to call her but can call Judy Restrepo. She was very irritated and stated she expected a call yesterday. Advised that we did call Judy Restrepo. She became very loud and stated he is not able to  the phone or answer. Again brought her back to Judy Restrepo regarding care. Advised that the new plan of care is for patient to try Ocrevus. She inquired on what needs to be done to start ocrevus now. Advised that this infusion does require more lab work. Again patients mother became very loud and stated Judy Restrepo already had lab work done and should not have to have more. She stated it is very difficult to get patient out of the house. Explained to Chantelle Juarez that patient was JCV positive and this medication is the best choice but will need different lab work done. Advised that the lab work was faxed to [facility] yesterday and also a copy sent to the home address on file. She requested the number again for [facility] to schedule appointment tomorrow. Advised that a copy of the start form for Willistine Jakes was sent to patient along with a HIPPA compliance form for patient to file out and mail back or bring back. Advised that once the paperwork was completed and labs resulted information will be sent for patient to start Willistine Jakes as long as insurance will cover. Chantelle Juarez again stated this whole process has taken too long. Chantelle Juarez stated since the last appointment (11/16/22) that nothing is getting done about her son and she is worried. Advised that this office understands and will get the information processed once complete. Patient's mother states (I know how to get things done) advised Chantelle Juarez that once the JCV was resulted the new plan is in place. She stated if the JCV was done during the appointment it would not have taken so long.  This writer tried to explain the process and time frame but she insisted to know this writers name and the customer service line. Provided name and patient advocacy number. Phone abruptly disconnected. Mother's phone number is [phone] for future.

## 2022-12-16 ENCOUNTER — APPOINTMENT (OUTPATIENT)
Dept: MRI IMAGING | Facility: HOSPITAL | Age: 47
End: 2022-12-16
Attending: Other
Payer: MEDICAID

## 2022-12-16 ENCOUNTER — APPOINTMENT (OUTPATIENT)
Dept: MRI IMAGING | Facility: HOSPITAL | Age: 47
End: 2022-12-16
Attending: EMERGENCY MEDICINE
Payer: MEDICAID

## 2022-12-16 ENCOUNTER — APPOINTMENT (OUTPATIENT)
Dept: GENERAL RADIOLOGY | Facility: HOSPITAL | Age: 47
End: 2022-12-16
Attending: EMERGENCY MEDICINE
Payer: MEDICAID

## 2022-12-16 ENCOUNTER — OFFICE VISIT (OUTPATIENT)
Dept: PHYSICAL THERAPY | Facility: HOSPITAL | Age: 47
End: 2022-12-16
Attending: Other
Payer: MEDICAID

## 2022-12-16 ENCOUNTER — TELEPHONE (OUTPATIENT)
Dept: NEUROLOGY | Facility: CLINIC | Age: 47
End: 2022-12-16

## 2022-12-16 ENCOUNTER — HOSPITAL ENCOUNTER (INPATIENT)
Facility: HOSPITAL | Age: 47
LOS: 6 days | Discharge: HOME | End: 2022-12-22
Attending: EMERGENCY MEDICINE | Admitting: HOSPITALIST
Payer: MEDICAID

## 2022-12-16 DIAGNOSIS — G35 MULTIPLE SCLEROSIS EXACERBATION (HCC): Primary | ICD-10-CM

## 2022-12-16 LAB
ABSOLUTE BASOPHILS: 42 CELLS/UL (ref 0–200)
ABSOLUTE EOSINOPHILS: 159 CELLS/UL (ref 15–500)
ABSOLUTE LYMPHOCYTES: 2162 CELLS/UL (ref 850–3900)
ABSOLUTE MONOCYTES: 546 CELLS/UL (ref 200–950)
ABSOLUTE NEUTROPHILS: 2390 CELLS/UL (ref 1500–7800)
ALBUMIN SERPL-MCNC: 3.8 G/DL (ref 3.4–5)
ALBUMIN/GLOB SERPL: 1.3 {RATIO} (ref 1–2)
ALBUMIN/GLOBULIN RATIO: 1.8 (CALC) (ref 1–2.5)
ALBUMIN: 4.2 G/DL (ref 3.6–5.1)
ALKALINE PHOSPHATASE: 78 U/L (ref 36–130)
ALP LIVER SERPL-CCNC: 81 U/L
ALT SERPL-CCNC: 40 U/L
ALT: 31 U/L (ref 9–46)
ANION GAP SERPL CALC-SCNC: 4 MMOL/L (ref 0–18)
AST SERPL-CCNC: 16 U/L (ref 15–37)
AST: 16 U/L (ref 10–40)
ATRIAL RATE: 78 BPM
BASOPHILS # BLD AUTO: 0.04 X10(3) UL (ref 0–0.2)
BASOPHILS NFR BLD AUTO: 0.7 %
BASOPHILS: 0.8 %
BILIRUB SERPL-MCNC: 0.3 MG/DL (ref 0.1–2)
BILIRUB UR QL STRIP.AUTO: NEGATIVE
BILIRUBIN, TOTAL: 0.4 MG/DL (ref 0.2–1.2)
BUN BLD-MCNC: 11 MG/DL (ref 7–18)
BUN: 15 MG/DL (ref 7–25)
CALCIUM BLD-MCNC: 9.2 MG/DL (ref 8.5–10.1)
CALCIUM: 9.3 MG/DL (ref 8.6–10.3)
CARBON DIOXIDE: 30 MMOL/L (ref 20–32)
CHLORIDE SERPL-SCNC: 108 MMOL/L (ref 98–112)
CHLORIDE: 105 MMOL/L (ref 98–110)
CLARITY UR REFRACT.AUTO: CLEAR
CO2 SERPL-SCNC: 29 MMOL/L (ref 21–32)
COLOR UR AUTO: YELLOW
CREAT BLD-MCNC: 0.97 MG/DL
CREATININE: 0.89 MG/DL (ref 0.6–1.29)
EGFR: 108 ML/MIN/1.73M2
EOSINOPHIL # BLD AUTO: 0.08 X10(3) UL (ref 0–0.7)
EOSINOPHIL NFR BLD AUTO: 1.5 %
EOSINOPHILS: 3 %
ERYTHROCYTE [DISTWIDTH] IN BLOOD BY AUTOMATED COUNT: 13.6 %
GFR SERPLBLD BASED ON 1.73 SQ M-ARVRAT: 98 ML/MIN/1.73M2 (ref 60–?)
GLOBULIN PLAS-MCNC: 3 G/DL (ref 2.8–4.4)
GLOBULIN: 2.3 G/DL (CALC) (ref 1.9–3.7)
GLUCOSE BLD-MCNC: 109 MG/DL (ref 70–99)
GLUCOSE UR STRIP.AUTO-MCNC: 50 MG/DL
GLUCOSE: 96 MG/DL (ref 65–99)
HCT VFR BLD AUTO: 44.3 %
HEMATOCRIT: 44.7 % (ref 38.5–50)
HEMOGLOBIN: 15.2 G/DL (ref 13.2–17.1)
HGB BLD-MCNC: 15.6 G/DL
IMM GRANULOCYTES # BLD AUTO: 0.02 X10(3) UL (ref 0–1)
IMM GRANULOCYTES NFR BLD: 0.4 %
IMMUNOGLOBULIN G: 742 MG/DL (ref 600–1640)
IMMUNOGLOBULIN M: 45 MG/DL (ref 50–300)
KETONES UR STRIP.AUTO-MCNC: NEGATIVE MG/DL
LEUKOCYTE ESTERASE UR QL STRIP.AUTO: NEGATIVE
LYMPHOCYTES # BLD AUTO: 1.43 X10(3) UL (ref 1–4)
LYMPHOCYTES NFR BLD AUTO: 26 %
LYMPHOCYTES: 40.8 %
MCH RBC QN AUTO: 30.8 PG (ref 26–34)
MCH: 30 PG (ref 27–33)
MCHC RBC AUTO-ENTMCNC: 35.2 G/DL (ref 31–37)
MCHC: 34 G/DL (ref 32–36)
MCV RBC AUTO: 87.4 FL
MCV: 88.3 FL (ref 80–100)
MITOGEN-NIL: >10 IU/ML
MONOCYTES # BLD AUTO: 0.57 X10(3) UL (ref 0.1–1)
MONOCYTES NFR BLD AUTO: 10.4 %
MONOCYTES: 10.3 %
MPV: 9.1 FL (ref 7.5–12.5)
NEUTROPHILS # BLD AUTO: 3.35 X10 (3) UL (ref 1.5–7.7)
NEUTROPHILS # BLD AUTO: 3.35 X10(3) UL (ref 1.5–7.7)
NEUTROPHILS NFR BLD AUTO: 61 %
NEUTROPHILS: 45.1 %
NIL: 0.02 IU/ML
NITRITE UR QL STRIP.AUTO: NEGATIVE
OSMOLALITY SERPL CALC.SUM OF ELEC: 292 MOSM/KG (ref 275–295)
P AXIS: 38 DEGREES
P-R INTERVAL: 128 MS
PH UR STRIP.AUTO: 5 [PH] (ref 5–8)
PLATELET # BLD AUTO: 320 10(3)UL (ref 150–450)
PLATELET COUNT: 364 THOUSAND/UL (ref 140–400)
POTASSIUM SERPL-SCNC: 3.8 MMOL/L (ref 3.5–5.1)
POTASSIUM: 4 MMOL/L (ref 3.5–5.3)
PROT SERPL-MCNC: 6.8 G/DL (ref 6.4–8.2)
PROT UR STRIP.AUTO-MCNC: NEGATIVE MG/DL
PROTEIN, TOTAL: 6.5 G/DL (ref 6.1–8.1)
Q-T INTERVAL: 380 MS
QRS DURATION: 96 MS
QTC CALCULATION (BEZET): 433 MS
QUANTIFERON(R)-TB GOLD PLUS, 1 TUBE: NEGATIVE
R AXIS: 45 DEGREES
RBC # BLD AUTO: 5.07 X10(6)UL
RBC UR QL AUTO: NEGATIVE
RDW: 13.7 % (ref 11–15)
RED BLOOD CELL COUNT: 5.06 MILLION/UL (ref 4.2–5.8)
SARS-COV-2 RNA RESP QL NAA+PROBE: NOT DETECTED
SODIUM SERPL-SCNC: 141 MMOL/L (ref 136–145)
SODIUM: 141 MMOL/L (ref 135–146)
SP GR UR STRIP.AUTO: 1.02 (ref 1–1.03)
T AXIS: 45 DEGREES
TB1-NIL: 0.02 IU/ML
TB2-NIL: 0.03 IU/ML
UROBILINOGEN UR STRIP.AUTO-MCNC: <2 MG/DL
VENTRICULAR RATE: 78 BPM
WBC # BLD AUTO: 5.5 X10(3) UL (ref 4–11)
WHITE BLOOD CELL COUNT: 5.3 THOUSAND/UL (ref 3.8–10.8)

## 2022-12-16 PROCEDURE — 72157 MRI CHEST SPINE W/O & W/DYE: CPT | Performed by: OTHER

## 2022-12-16 PROCEDURE — 99254 IP/OBS CNSLTJ NEW/EST MOD 60: CPT | Performed by: OTHER

## 2022-12-16 PROCEDURE — 97110 THERAPEUTIC EXERCISES: CPT

## 2022-12-16 PROCEDURE — 99223 1ST HOSP IP/OBS HIGH 75: CPT | Performed by: HOSPITALIST

## 2022-12-16 PROCEDURE — 70553 MRI BRAIN STEM W/O & W/DYE: CPT | Performed by: EMERGENCY MEDICINE

## 2022-12-16 PROCEDURE — 71045 X-RAY EXAM CHEST 1 VIEW: CPT | Performed by: EMERGENCY MEDICINE

## 2022-12-16 RX ORDER — GABAPENTIN 300 MG/1
300 CAPSULE ORAL 3 TIMES DAILY
Status: DISCONTINUED | OUTPATIENT
Start: 2022-12-16 | End: 2022-12-22

## 2022-12-16 RX ORDER — LORAZEPAM 2 MG/ML
0.5 INJECTION INTRAMUSCULAR ONCE
Status: DISCONTINUED | OUTPATIENT
Start: 2022-12-16 | End: 2022-12-16 | Stop reason: HOSPADM

## 2022-12-16 RX ORDER — PROCHLORPERAZINE EDISYLATE 5 MG/ML
5 INJECTION INTRAMUSCULAR; INTRAVENOUS EVERY 8 HOURS PRN
Status: DISCONTINUED | OUTPATIENT
Start: 2022-12-16 | End: 2022-12-22

## 2022-12-16 RX ORDER — GADOTERATE MEGLUMINE 376.9 MG/ML
20 INJECTION INTRAVENOUS
Status: COMPLETED | OUTPATIENT
Start: 2022-12-16 | End: 2022-12-16

## 2022-12-16 RX ORDER — ENOXAPARIN SODIUM 100 MG/ML
40 INJECTION SUBCUTANEOUS DAILY
Status: DISCONTINUED | OUTPATIENT
Start: 2022-12-16 | End: 2022-12-22

## 2022-12-16 RX ORDER — LORAZEPAM 2 MG/ML
0.5 INJECTION INTRAMUSCULAR ONCE
Status: DISCONTINUED | OUTPATIENT
Start: 2022-12-16 | End: 2022-12-22

## 2022-12-16 RX ORDER — IBUPROFEN 200 MG
400 TABLET ORAL
COMMUNITY

## 2022-12-16 RX ORDER — ONDANSETRON 2 MG/ML
4 INJECTION INTRAMUSCULAR; INTRAVENOUS EVERY 6 HOURS PRN
Status: DISCONTINUED | OUTPATIENT
Start: 2022-12-16 | End: 2022-12-22

## 2022-12-16 RX ORDER — ALBUTEROL SULFATE 90 UG/1
2 AEROSOL, METERED RESPIRATORY (INHALATION) EVERY 6 HOURS PRN
Status: DISCONTINUED | OUTPATIENT
Start: 2022-12-16 | End: 2022-12-22

## 2022-12-16 RX ORDER — ACETAMINOPHEN 500 MG
500 TABLET ORAL EVERY 4 HOURS PRN
Status: DISCONTINUED | OUTPATIENT
Start: 2022-12-16 | End: 2022-12-19

## 2022-12-16 RX ORDER — CYCLOBENZAPRINE HCL 10 MG
10 TABLET ORAL 2 TIMES DAILY PRN
Status: DISCONTINUED | OUTPATIENT
Start: 2022-12-16 | End: 2022-12-22

## 2022-12-16 RX ORDER — POTASSIUM CHLORIDE 20 MEQ/1
40 TABLET, EXTENDED RELEASE ORAL ONCE
Status: COMPLETED | OUTPATIENT
Start: 2022-12-16 | End: 2022-12-16

## 2022-12-16 RX ORDER — LORAZEPAM 2 MG/ML
0.5 INJECTION INTRAMUSCULAR ONCE
Status: COMPLETED | OUTPATIENT
Start: 2022-12-16 | End: 2022-12-16

## 2022-12-16 RX ORDER — OMEPRAZOLE 40 MG/1
40 CAPSULE, DELAYED RELEASE ORAL DAILY
COMMUNITY

## 2022-12-16 RX ORDER — SODIUM CHLORIDE 9 MG/ML
INJECTION, SOLUTION INTRAVENOUS CONTINUOUS
Status: DISCONTINUED | OUTPATIENT
Start: 2022-12-16 | End: 2022-12-22

## 2022-12-16 NOTE — TELEPHONE ENCOUNTER
Jane from [facility] calling to speak to nursing regarding pt - he is reporting \"new visual loss\". He states his \"field of vision is cut, blackness and he has fallen recently\".

## 2022-12-16 NOTE — ED QUICK NOTES
Orders for admission, patient is aware of plan and ready to go upstairs. Any questions, please call ED RN Agustín Gerardo at extension [phone]. Patient Covid vaccination status: Unvaccinated     COVID Test Ordered in ED: Rapid SARS-CoV-2 by PCR    COVID Suspicion at Admission: No risk with negative rapid    Running Infusions:      Mental Status/LOC at time of transport: A/Ox3    Other pertinent information: still needs MRI!   CIWA score: N/A   NIH score:  N/A

## 2022-12-16 NOTE — TELEPHONE ENCOUNTER
Spoke to patient's mother regarding being sent to the ER. She states she is more concerned that her son has had to wait 2 months to get any treatment. Advised mother that we need to focus right now on patients symptoms and will monitor for plan of care. She stated she will come up and have fred sign paperwork so we are able to talk to her. Inquired if she has received the ocrevus start form. She stated no. Lab work has been completed. Advised that we have a copy so after patient is seen in the ER was can have him come up and sign the start form. She stated 'oh yea likes hes healthy\" and hung up the phone. Patient was sent to the ER due to multiple falls this week and at least once hitting his head. Patient has acute vision loss.

## 2022-12-16 NOTE — TELEPHONE ENCOUNTER
S-Jane, physical therapist calling with condition update on pt    B-being followed for MS    JUSTO-Jane states pt seeing her for 2nd time. Reports acute vision loss in left eye, can only see shadows in peripheral     Also reported 4-5 falls since Monday, at least once hitting his head. R-recommended pt consider ER for evaluation of acute symptoms.  Verbalized understanding

## 2022-12-16 NOTE — ED INITIAL ASSESSMENT (HPI)
Patient reports c/o multiple falls x 2 days ago. Patient has history of MS and history of balance issues. Patient reports hitting head on floor and now has left eye vision issues and pain. Patient rates left eye pain 7 out of 10 on pain scale.

## 2022-12-16 NOTE — PROGRESS NOTES
NURSING ADMISSION NOTE      Patient admitted via Wheelchair  Oriented to room. Safety precautions initiated. Bed in low position. Call light in reach. Pt is aaox4 but most of the info was taken from his mother, denies pain, admitted from ED for MS exacerbation, seen by .

## 2022-12-17 LAB — POTASSIUM SERPL-SCNC: 4.3 MMOL/L (ref 3.5–5.1)

## 2022-12-17 PROCEDURE — 99233 SBSQ HOSP IP/OBS HIGH 50: CPT | Performed by: OTHER

## 2022-12-17 PROCEDURE — 99232 SBSQ HOSP IP/OBS MODERATE 35: CPT | Performed by: STUDENT IN AN ORGANIZED HEALTH CARE EDUCATION/TRAINING PROGRAM

## 2022-12-17 NOTE — PROGRESS NOTES
Patient alert x3, MRI completed during shift. No c/o of pain. Assist x1 to bathroom. Slept well over night. Will continue to monitor.

## 2022-12-17 NOTE — PLAN OF CARE
Pt is aaox4, denies pain, on high dose solumedrol. Problem: Patient/Family Goals  Goal: Patient/Family Long Term Goal  Description: Patient's Long Term Goal:     Interventions:  -   - See additional Care Plan goals for specific interventions  Outcome: Progressing  Goal: Patient/Family Short Term Goal  Description: Patient's Short Term Goal:     Interventions:   -   - See additional Care Plan goals for specific interventions  Outcome: Progressing     Problem: NEUROLOGICAL - ADULT  Goal: Achieves stable or improved neurological status  Description: INTERVENTIONS  - Assess for and report changes in neurological status  - Initiate measures to prevent increased intracranial pressure  - Maintain blood pressure and fluid volume within ordered parameters to optimize cerebral perfusion and minimize risk of hemorrhage  - Monitor temperature, glucose, and sodium. Initiate appropriate interventions as ordered  Outcome: Progressing     Problem: SAFETY ADULT - FALL  Goal: Free from fall injury  Description: INTERVENTIONS:  - Assess pt frequently for physical needs  - Identify cognitive and physical deficits and behaviors that affect risk of falls.   - [place] fall precautions as indicated by assessment.  - Educate pt/family on patient safety including physical limitations  - Instruct pt to call for assistance with activity based on assessment  - Modify environment to reduce risk of injury  - Provide assistive devices as appropriate  - Consider OT/PT consult to assist with strengthening/mobility  - Encourage toileting schedule  Outcome: Progressing

## 2022-12-18 PROCEDURE — 99232 SBSQ HOSP IP/OBS MODERATE 35: CPT | Performed by: HOSPITALIST

## 2022-12-18 PROCEDURE — 99233 SBSQ HOSP IP/OBS HIGH 50: CPT | Performed by: OTHER

## 2022-12-18 NOTE — PLAN OF CARE
Assumed patient care at 0730. Vital signs stable. Patient alert and oriented x 4. Patient denies pain but states he has numbness and tingling to his hands. States his vision is improving in his left eye. He removed his eye patch. Problem: NEUROLOGICAL - ADULT  Goal: Achieves stable or improved neurological status  Description: INTERVENTIONS  - Assess for and report changes in neurological status  - Initiate measures to prevent increased intracranial pressure  - Maintain blood pressure and fluid volume within ordered parameters to optimize cerebral perfusion and minimize risk of hemorrhage  - Monitor temperature, glucose, and sodium. Initiate appropriate interventions as ordered  Outcome: Progressing     Problem: SAFETY ADULT - FALL  Goal: Free from fall injury  Description: INTERVENTIONS:  - Assess pt frequently for physical needs  - Identify cognitive and physical deficits and behaviors that affect risk of falls.   - [place] fall precautions as indicated by assessment.  - Educate pt/family on patient safety including physical limitations  - Instruct pt to call for assistance with activity based on assessment  - Modify environment to reduce risk of injury  - Provide assistive devices as appropriate  - Consider OT/PT consult to assist with strengthening/mobility  - Encourage toileting schedule  Outcome: Progressing

## 2022-12-18 NOTE — PLAN OF CARE
Received pt a&o x4, delayed responses at time. VSS. Afebrile. Pt c/o pain & tingling in hands, hot packs given per pt request. IV steroids infusing per MAR. Gauze placed over pt L eye. Per patient, vision seems to be improving. IVF infusing. Call light within reach. Safety precautions in place. Problem: NEUROLOGICAL - ADULT  Goal: Achieves stable or improved neurological status  Description: INTERVENTIONS  - Assess for and report changes in neurological status  - Initiate measures to prevent increased intracranial pressure  - Maintain blood pressure and fluid volume within ordered parameters to optimize cerebral perfusion and minimize risk of hemorrhage  - Monitor temperature, glucose, and sodium. Initiate appropriate interventions as ordered  Outcome: Progressing     Problem: SAFETY ADULT - FALL  Goal: Free from fall injury  Description: INTERVENTIONS:  - Assess pt frequently for physical needs  - Identify cognitive and physical deficits and behaviors that affect risk of falls.   - [place] fall precautions as indicated by assessment.  - Educate pt/family on patient safety including physical limitations  - Instruct pt to call for assistance with activity based on assessment  - Modify environment to reduce risk of injury  - Provide assistive devices as appropriate  - Consider OT/PT consult to assist with strengthening/mobility  - Encourage toileting schedule  Outcome: Progressing

## 2022-12-19 LAB — AQUAPORIN-4 RECEPTOR ANTIBODY: <1.5 U/ML

## 2022-12-19 PROCEDURE — 99233 SBSQ HOSP IP/OBS HIGH 50: CPT | Performed by: OTHER

## 2022-12-19 PROCEDURE — 99232 SBSQ HOSP IP/OBS MODERATE 35: CPT | Performed by: STUDENT IN AN ORGANIZED HEALTH CARE EDUCATION/TRAINING PROGRAM

## 2022-12-19 RX ORDER — ALPRAZOLAM 0.25 MG/1
0.25 TABLET ORAL 2 TIMES DAILY PRN
Status: DISCONTINUED | OUTPATIENT
Start: 2022-12-19 | End: 2022-12-22

## 2022-12-19 RX ORDER — ACETAMINOPHEN 500 MG
500 TABLET ORAL EVERY 4 HOURS PRN
Status: DISCONTINUED | OUTPATIENT
Start: 2022-12-19 | End: 2022-12-22

## 2022-12-19 NOTE — PLAN OF CARE
Assumed care at 1630. Pt received alert and oriented x4, VSS. No sob on RA. Afebrile. Ambulating to bathroom with assist.Fall precautions in place. Call light within reach. Problem: NEUROLOGICAL - ADULT  Goal: Achieves stable or improved neurological status  Description: INTERVENTIONS  - Assess for and report changes in neurological status  - Initiate measures to prevent increased intracranial pressure  - Maintain blood pressure and fluid volume within ordered parameters to optimize cerebral perfusion and minimize risk of hemorrhage  - Monitor temperature, glucose, and sodium. Initiate appropriate interventions as ordered  Outcome: Progressing     Problem: SAFETY ADULT - FALL  Goal: Free from fall injury  Description: INTERVENTIONS:  - Assess pt frequently for physical needs  - Identify cognitive and physical deficits and behaviors that affect risk of falls.   - [place] fall precautions as indicated by assessment.  - Educate pt/family on patient safety including physical limitations  - Instruct pt to call for assistance with activity based on assessment  - Modify environment to reduce risk of injury  - Provide assistive devices as appropriate  - Consider OT/PT consult to assist with strengthening/mobility  - Encourage toileting schedule  Outcome: Progressing

## 2022-12-19 NOTE — PLAN OF CARE
Pt received alert and oriented x4, c/o pain to BUE, prn flexeril given with relief. Ambulating to bathroom with assist. IV solumedrol per MAR. Tolerating diet. VSS, afebrile. Family updated on POC. Fall precautions in place. Call light within reach. Problem: NEUROLOGICAL - ADULT  Goal: Achieves stable or improved neurological status  Description: INTERVENTIONS  - Assess for and report changes in neurological status  - Initiate measures to prevent increased intracranial pressure  - Maintain blood pressure and fluid volume within ordered parameters to optimize cerebral perfusion and minimize risk of hemorrhage  - Monitor temperature, glucose, and sodium. Initiate appropriate interventions as ordered  Outcome: Progressing     Problem: SAFETY ADULT - FALL  Goal: Free from fall injury  Description: INTERVENTIONS:  - Assess pt frequently for physical needs  - Identify cognitive and physical deficits and behaviors that affect risk of falls.   - [place] fall precautions as indicated by assessment.  - Educate pt/family on patient safety including physical limitations  - Instruct pt to call for assistance with activity based on assessment  - Modify environment to reduce risk of injury  - Provide assistive devices as appropriate  - Consider OT/PT consult to assist with strengthening/mobility  - Encourage toileting schedule  Outcome: Progressing

## 2022-12-19 NOTE — PLAN OF CARE
Patient is alert and oriented, on RA. Denies pain or SOB. Patient is able to walk to the bathroom with steady gait, good appetite. Tingling in BUE and blurry vision in the L eye improving per patient. IV fluids infusing , tolerating well. Bed alarm is on, call light within reach, will continue to monitor. Problem: NEUROLOGICAL - ADULT  Goal: Achieves stable or improved neurological status  Description: INTERVENTIONS  - Assess for and report changes in neurological status  - Initiate measures to prevent increased intracranial pressure  - Maintain blood pressure and fluid volume within ordered parameters to optimize cerebral perfusion and minimize risk of hemorrhage  - Monitor temperature, glucose, and sodium. Initiate appropriate interventions as ordered  Outcome: Progressing     Problem: SAFETY ADULT - FALL  Goal: Free from fall injury  Description: INTERVENTIONS:  - Assess pt frequently for physical needs  - Identify cognitive and physical deficits and behaviors that affect risk of falls.   - [place] fall precautions as indicated by assessment.  - Educate pt/family on patient safety including physical limitations  - Instruct pt to call for assistance with activity based on assessment  - Modify environment to reduce risk of injury  - Provide assistive devices as appropriate  - Consider OT/PT consult to assist with strengthening/mobility  - Encourage toileting schedule  Outcome: Progressing

## 2022-12-20 ENCOUNTER — APPOINTMENT (OUTPATIENT)
Dept: PHYSICAL THERAPY | Facility: HOSPITAL | Age: 47
End: 2022-12-20
Attending: Other
Payer: MEDICAID

## 2022-12-20 PROCEDURE — 90792 PSYCH DIAG EVAL W/MED SRVCS: CPT | Performed by: OTHER

## 2022-12-20 PROCEDURE — 99233 SBSQ HOSP IP/OBS HIGH 50: CPT | Performed by: OTHER

## 2022-12-20 PROCEDURE — 99232 SBSQ HOSP IP/OBS MODERATE 35: CPT | Performed by: STUDENT IN AN ORGANIZED HEALTH CARE EDUCATION/TRAINING PROGRAM

## 2022-12-20 NOTE — PLAN OF CARE
Pt received A&Ox4. VSS. RA. Medications given per MAR. Pt denies pain. Numbness/tingling to BUE. Ambulation with assistance. IVF infusing @ 100 ml/hr. Call light within reach. Fall precautions in place. Problem: NEUROLOGICAL - ADULT  Goal: Achieves stable or improved neurological status  Description: INTERVENTIONS  - Assess for and report changes in neurological status  - Initiate measures to prevent increased intracranial pressure  - Maintain blood pressure and fluid volume within ordered parameters to optimize cerebral perfusion and minimize risk of hemorrhage  - Monitor temperature, glucose, and sodium. Initiate appropriate interventions as ordered  Outcome: Progressing     Problem: SAFETY ADULT - FALL  Goal: Free from fall injury  Description: INTERVENTIONS:  - Assess pt frequently for physical needs  - Identify cognitive and physical deficits and behaviors that affect risk of falls.   - [place] fall precautions as indicated by assessment.  - Educate pt/family on patient safety including physical limitations  - Instruct pt to call for assistance with activity based on assessment  - Modify environment to reduce risk of injury  - Provide assistive devices as appropriate  - Consider OT/PT consult to assist with strengthening/mobility  - Encourage toileting schedule  Outcome: Progressing

## 2022-12-20 NOTE — TELEPHONE ENCOUNTER
Pt mother brought in verbal release and the OCREVUS form; endorse to provider for review - verbal added to chart

## 2022-12-20 NOTE — CM/SW NOTE
SW spoke with patient at bedside regarding PT recommendation for Acute Rehab. SW explained what acute rehab is and the admissions process. Patient's requested we call his mother due to her having just left the hospital. SW informed patient's mother of the same information on speakerphone. Patient and his mother are both in agreement to Acute Rehab with preference being [facility] due to it being closest to his home. SW will place PMR consult and send referrals to Acute Rehab if it is determined to be the appropriate level of care. Patient will need insurance authorization to attend rehab. SW will continue to follow for plan of care changes and remain available for any additional DC needs or concerns.      Zaki Laureano MSW, LSW  Discharge Planner   [phone]
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
## 2022-12-21 ENCOUNTER — TELEPHONE (OUTPATIENT)
Dept: NEUROLOGY | Facility: CLINIC | Age: 47
End: 2022-12-21

## 2022-12-21 PROCEDURE — 99232 SBSQ HOSP IP/OBS MODERATE 35: CPT | Performed by: OTHER

## 2022-12-21 PROCEDURE — 99232 SBSQ HOSP IP/OBS MODERATE 35: CPT | Performed by: STUDENT IN AN ORGANIZED HEALTH CARE EDUCATION/TRAINING PROGRAM

## 2022-12-21 RX ORDER — MELATONIN
3 NIGHTLY
Status: DISCONTINUED | OUTPATIENT
Start: 2022-12-21 | End: 2022-12-22

## 2022-12-21 NOTE — TELEPHONE ENCOUNTER
Pts Mom calling requesting to speak to nurse [facility]. She states she is calling due to orders that haven't been signed for medication as well as the verbal release of information. Verbal release of information has been updated in chart. Per nurse Chuy Gates the hospital neurologist should be called for medications while patient is in patient.

## 2022-12-21 NOTE — PROGRESS NOTES
12/21/22 1107   Clinical Encounter Type   Visited With Patient   Taxonomy   Intended Effects Build relationship of care and support   Methods Offer spiritual/Yazidism support; Offer emotional support   Interventions Acknowledge current situation; Active listening; Ask guided questions; Discuss concerns; Explain  role; Share words of hope and inspiration;[place]      visited with patient [place], mother Ms. Elliott Ayala at [facility].  talked briefly with patient about his concerns with progression of MS and loss of vision. Car Dale expressed fear.  offered spiritual/emotional support through active listening and prayer. Patient showed appreciation.  offered follow up visit when patient is alone. Patient showed openness to follow up visit.  remains available for support. Spiritual Care support can be requested via an Epic consult. TIFFANY Henson. Div  Extension: [phone]

## 2022-12-21 NOTE — OCCUPATIONAL THERAPY NOTE
Charts reviewed. Pt ed regarding therapy and POC. Pt declining therapy despite encouragement, pt reporting \"I just feel awful this morning\" offered to return later today, pt declining requesting OT return tomorrow. OT will follow up as able and appropriate.

## 2022-12-21 NOTE — PROGRESS NOTES
12/21/22 1627   Clinical Encounter Type   Visited With Patient   Taxonomy   Methods Offer support;Encourage self care;Encourage self reflection;Encourage sharing of feelings   Interventions Active listening; Ask guided questions; Acknowledge current situation;Hiko      completed follow up visit with patient.  offered spiritual/emotional support through active listening and prayer.  remains available for support. Spiritual Care support can be requested via an [facility] consult. ISABELLE Manrique  Extension: [phone]

## 2022-12-21 NOTE — CM/SW NOTE
SW noted PMR consult is recommending [facility]. SW sent referral for [facility] via [facility] and will discuss placement options with patient once choice list is available. SW will continue to follow for plan of care changes and remain available for any additional DC needs or concerns.      Jacob Moses MSW, LSW  Discharge Planner   [phone]

## 2022-12-21 NOTE — PROGRESS NOTES
Patient is alert and oriented x4. Medications given per MAR. Psych consult for psych eval. Physical medicine rehab consulted. PT worked with the patient. Fall precautions in place. Call light within reach.

## 2022-12-21 NOTE — TELEPHONE ENCOUNTER
Spoke to patients mother (ok per [employer]). Chantelle Juarez states [facility]ood[facility] in suppose to be discharged tomorrow. Chantelle Juarez inquired if patient will be able to get Ocrevus infusion tomorrow to make him better. Advised patient is currently in the hospital and we are unaware of discharge. Did advised Chantelle Juarez all paperwork has been completed and signed. Advised that we will send the information over to the treatment center for prior authorization to begin. Also stated we will need to wait until prior authorization has been completed before patient can receive treatment    Chantelle Juarez advised that patient is not any better. That he is currently unable to walk or see. She states she is unsure if she will be able to take care of him when he is at home. Chantelle Juarez stated she spoke to patients insurance and ocrevus should not be held from patient. Ki Quach that the the medication is not being withheld from patient. Advised that we just received the signed paperwork yesterday from patient, today from the provider and will fax all information over to start this plan of care. Chantelle Juarez verbalized understanding.

## 2022-12-21 NOTE — TELEPHONE ENCOUNTER
Ocrevus start form faxed to [facility] to get patient enrolled in their programs. Included copy of face sheet and insurance card. Confirmation received. Signed IVX infusion order faxed to [facility] along with face sheet, insurance card, copy of LOV note, and all lab results from 12/14/22. Confirmation received. IVX will conduct benefits investigation.

## 2022-12-21 NOTE — PLAN OF CARE
Pt received A&Ox4. VSS. RA. Medications given per MAR. Pt denies pain. Numbness/tingling to BUE. Ambulation with assistance. IVF infusing @ 100 ml/hr. Call light within reach. Fall precautions in place. Problem: NEUROLOGICAL - ADULT  Goal: Achieves stable or improved neurological status  Description: INTERVENTIONS  - Assess for and report changes in neurological status  - Initiate measures to prevent increased intracranial pressure  - Maintain blood pressure and fluid volume within ordered parameters to optimize cerebral perfusion and minimize risk of hemorrhage  - Monitor temperature, glucose, and sodium. Initiate appropriate interventions as ordered  Outcome: Progressing     Problem: SAFETY ADULT - FALL  Goal: Free from fall injury  Description: INTERVENTIONS:  - Assess pt frequently for physical needs  - Identify cognitive and physical deficits and behaviors that affect risk of falls.   - [place] fall precautions as indicated by assessment.  - Educate pt/family on patient safety including physical limitations  - Instruct pt to call for assistance with activity based on assessment  - Modify environment to reduce risk of injury  - Provide assistive devices as appropriate  - Consider OT/PT consult to assist with strengthening/mobility  - Encourage toileting schedule  Outcome: Progressing

## 2022-12-22 ENCOUNTER — APPOINTMENT (OUTPATIENT)
Dept: PHYSICAL THERAPY | Facility: HOSPITAL | Age: 47
End: 2022-12-22
Attending: Other
Payer: MEDICAID

## 2022-12-22 VITALS
BODY MASS INDEX: 28.59 KG/M2 | HEART RATE: 56 BPM | TEMPERATURE: 98 F | SYSTOLIC BLOOD PRESSURE: 126 MMHG | OXYGEN SATURATION: 97 % | DIASTOLIC BLOOD PRESSURE: 79 MMHG | WEIGHT: 177.88 LBS | HEIGHT: 66 IN | RESPIRATION RATE: 20 BRPM

## 2022-12-22 PROCEDURE — 99239 HOSP IP/OBS DSCHRG MGMT >30: CPT | Performed by: STUDENT IN AN ORGANIZED HEALTH CARE EDUCATION/TRAINING PROGRAM

## 2022-12-22 PROCEDURE — 99232 SBSQ HOSP IP/OBS MODERATE 35: CPT | Performed by: NURSE PRACTITIONER

## 2022-12-22 RX ORDER — ALPRAZOLAM 0.25 MG/1
0.25 TABLET ORAL 2 TIMES DAILY PRN
Qty: 12 TABLET | Refills: 0 | Status: SHIPPED | OUTPATIENT
Start: 2022-12-22

## 2022-12-22 RX ORDER — PREDNISONE 20 MG/1
TABLET ORAL
Qty: 30 TABLET | Refills: 0 | Status: SHIPPED | OUTPATIENT
Start: 2022-12-22 | End: 2023-01-06

## 2022-12-22 NOTE — PLAN OF CARE
NURSING DISCHARGE NOTE    Discharged Home via Wheelchair. Accompanied by Family member and Support staff  Belongings Taken by patient/family. Pt received A&Ox4. Afebrile. VSS. Denies pain. C/o left eye blurriness and BUE numbness/tingling. IVPB solu-medrol given, transitioned to prednisone starting tomorrow per neuro. Pt refusing [facility], dc home w/ OP PT. Dc instructions given to pt and pt's mother at the bedside. Verbalized understanding. Rx and [employer] provided. PIV removed.

## 2022-12-22 NOTE — CM/SW NOTE
WENDY spoke with patient at bedside regarding DC planning. SW informed patient that his insurance will only cover long term placement and not a rehab facility. SW provided patient with list of the only two accepting [facility] facilities ([facility] and [facility]). Patient stated \"I am overwhelmed and you need to call my mom to talk to her about it\". SW called patient's mother Minerva Vogt to explain that patient does not qualify for Acute Rehab and that patient is only able to receive long term placement with his current insurance. Minerva Vogt reported \"You are not sticking my son in a nursing home. He will never leave there if he goes. I am bringing him home today. Have him downstairs by 12pm\". SW offered caregiver information which will be included in patient's DC paperwork. WENDY spoke with patient again at bedside and he is okay going home with his mother today. SW will continue to follow for plan of care changes and remain available for any additional DC needs or concerns.      Maxx Conklin MSW, LSW  Discharge Planner   [phone]

## 2022-12-22 NOTE — PROGRESS NOTES
Patient is alert and oriented x4. Medications given per MAR. Last dose of solu medrol tomorrow. IVF infusing. Fall precautions in place. Call light within reach.

## 2022-12-22 NOTE — PLAN OF CARE
Patient is alert and oriented, RA, steady gait, vitals are stable. L eye still blurry, pt still feels numbness and tingling in both hands, no improvement per patient. IVF infusing at 100 ml/hr. No complaints of pain or SOB. 1 BM overnight. Bed alarm is on, call light within reach, will continue to monitor. Problem: NEUROLOGICAL - ADULT  Goal: Achieves stable or improved neurological status  Description: INTERVENTIONS  - Assess for and report changes in neurological status  - Initiate measures to prevent increased intracranial pressure  - Maintain blood pressure and fluid volume within ordered parameters to optimize cerebral perfusion and minimize risk of hemorrhage  - Monitor temperature, glucose, and sodium. Initiate appropriate interventions as ordered  Outcome: Progressing     Problem: SAFETY ADULT - FALL  Goal: Free from fall injury  Description: INTERVENTIONS:  - Assess pt frequently for physical needs  - Identify cognitive and physical deficits and behaviors that affect risk of falls.   - [place] fall precautions as indicated by assessment.  - Educate pt/family on patient safety including physical limitations  - Instruct pt to call for assistance with activity based on assessment  - Modify environment to reduce risk of injury  - Provide assistive devices as appropriate  - Consider OT/PT consult to assist with strengthening/mobility  - Encourage toileting schedule  Outcome: Progressing

## 2022-12-27 ENCOUNTER — APPOINTMENT (OUTPATIENT)
Dept: PHYSICAL THERAPY | Facility: HOSPITAL | Age: 47
End: 2022-12-27
Attending: Other
Payer: MEDICAID

## 2022-12-27 ENCOUNTER — TELEPHONE (OUTPATIENT)
Dept: SURGERY | Facility: CLINIC | Age: 47
End: 2022-12-27

## 2022-12-27 ENCOUNTER — DOCUMENTATION ONLY (OUTPATIENT)
Dept: PHYSICAL THERAPY | Facility: HOSPITAL | Age: 47
End: 2022-12-27

## 2022-12-27 ENCOUNTER — TELEPHONE (OUTPATIENT)
Dept: PHYSICAL THERAPY | Facility: HOSPITAL | Age: 47
End: 2022-12-27

## 2022-12-27 DIAGNOSIS — R53.1 WEAKNESS: ICD-10-CM

## 2022-12-27 DIAGNOSIS — G35 MS (MULTIPLE SCLEROSIS) (HCC): Primary | ICD-10-CM

## 2022-12-27 DIAGNOSIS — R26.0 ATAXIC GAIT: ICD-10-CM

## 2022-12-27 NOTE — TELEPHONE ENCOUNTER
Left VM for patient. Due to being hospitalized, will need a new order to resume PT. Instructed patient will need to call Dr. Gaby Lopez in order to get a new order placed to resume PT if patient plans to continue. Left my desk number for call back. Reiterated will not be able to see patient for 11 AM appointment for today.

## 2022-12-27 NOTE — PROGRESS NOTES
Rin Carpenter and his mother presented to office. Explained that due to our policy, when patient is admitted to hospital, need a new order from physician to resume therapy. Explained voicemail was left this AM regarding this and need to cancel therapy appointment for today. Office was closed yesterday. Mother reports Shawn Dial could you do this to him? \", \"how could you do this to us? \". I reiterated this is policy for patient safety when there is a change in status and to ensure coverage. Mother denies getting VM, listed number in the chart and stating \"where did you get that number, that is not our number? \", explained this is number in Epic in Trav's medical chart. Explained  staff can update this number if she would like. Updated mobile to mother's number at mother's/patient's request. Rin Carpenter later reports it is his cell phone number that was in the chart. Confirms Thursday appointment but reports he will need to leave 13' early. Mom calling neurologists' office while standing at . I explained that I also sent Dr. Bossman Weston a message about getting order placed.

## 2022-12-27 NOTE — TELEPHONE ENCOUNTER
Pt mother calling stating she is at the PT appt and they informed her that because pt was in the hospital they need new order; please advise

## 2022-12-27 NOTE — TELEPHONE ENCOUNTER
Pt was in the hospital 12/15-22. Hosp directed him to see Dr Milagro Mantilla within 1 month. Elayne Nagy states he needs an Ocrevus injection. She wants to speak with a nurse to confirm this injection can be completed  at his appt on 12/29. Please advise. Endorsed to nursing.

## 2022-12-27 NOTE — TELEPHONE ENCOUNTER
Spoke with patient's mother Foster Taylor and explained the prior authorization process. Foster Claudia verbalized understanding and voiced concern about patient being short tempered and frustrated about the length of time it is taking to get the Ocrevus started. [facility] scheduled with Dr Pietro Hurtado on 12/29 and patient's mother requests Dr Pietro Hurtado discuss the prior authorization process with patient and address his mental health at [facility].

## 2022-12-27 NOTE — TELEPHONE ENCOUNTER
Pended PT order for provider to review and sign. Per patient's mother pt needs new PT order due to recent hospitalization. Routed to provider.

## 2022-12-28 ENCOUNTER — TELEPHONE (OUTPATIENT)
Dept: PHYSICAL THERAPY | Facility: HOSPITAL | Age: 47
End: 2022-12-28

## 2022-12-29 ENCOUNTER — TELEMEDICINE (OUTPATIENT)
Dept: NEUROLOGY | Facility: CLINIC | Age: 47
End: 2022-12-29
Payer: MEDICAID

## 2022-12-29 ENCOUNTER — APPOINTMENT (OUTPATIENT)
Dept: PHYSICAL THERAPY | Facility: HOSPITAL | Age: 47
End: 2022-12-29
Attending: Other
Payer: MEDICAID

## 2022-12-29 ENCOUNTER — TELEPHONE (OUTPATIENT)
Dept: PHYSICAL THERAPY | Facility: HOSPITAL | Age: 47
End: 2022-12-29

## 2022-12-29 ENCOUNTER — TELEPHONE (OUTPATIENT)
Dept: NEUROLOGY | Facility: CLINIC | Age: 47
End: 2022-12-29

## 2022-12-29 DIAGNOSIS — R26.0 ATAXIC GAIT: ICD-10-CM

## 2022-12-29 DIAGNOSIS — H46.9 OPTIC NEURITIS: ICD-10-CM

## 2022-12-29 DIAGNOSIS — G35 MULTIPLE SCLEROSIS EXACERBATION (HCC): ICD-10-CM

## 2022-12-29 PROCEDURE — 99214 OFFICE O/P EST MOD 30 MIN: CPT | Performed by: OTHER

## 2022-12-29 NOTE — TELEPHONE ENCOUNTER
Patient's mother Hakeem Hernandez given direct Admission instructions as stated below and verbalized understanding. .        Per Dr. Kings Alcazar, pt to be admitted directly to hospital on 12/30/2022 for MS Exacerbation  Patient needs a medical bed. Orders:  IV Solumedrol 1 gm daily for 3days. 1. [facility] at [phone] to confirm IP bed is available. (Spoke with Ana Claudio)     2. Patient does not have a PCP so this is a direct admit under Dr Kings Alcazar. 3. Patient to present to the ER Registration on 12/30/2022 at  9:00am.   Patient to inform staff that Direct Admission as been arranged. 4. Dr Kings Alcazar to place an order for the hospitalist to see patient. Patient notified of pending admission. Verbalized understanding of instructions.

## 2022-12-29 NOTE — TELEPHONE ENCOUNTER
Returned requested phonecall. Mom reports Rhenda Essex will be admitted tomorrow for 3 day infusion and further treatment. Will let therapist know if will not make Tuesday's appointment. Appreciative for PT's help. Discussed that depending if patient is admitted, may again need clearance for PT. PT will check chart on Monday as well.

## 2022-12-30 ENCOUNTER — APPOINTMENT (OUTPATIENT)
Dept: MRI IMAGING | Facility: HOSPITAL | Age: 47
End: 2022-12-30
Attending: Other
Payer: MEDICAID

## 2022-12-30 ENCOUNTER — HOSPITAL ENCOUNTER (INPATIENT)
Facility: HOSPITAL | Age: 47
LOS: 5 days | Discharge: ANOTHER HOSPITAL | End: 2023-01-04
Attending: Other | Admitting: Other
Payer: MEDICAID

## 2022-12-30 PROBLEM — H46.9 OPTIC NEURITIS: Status: ACTIVE | Noted: 2022-12-30

## 2022-12-30 PROBLEM — H54.3 DECREASED VISION IN BOTH EYES: Status: ACTIVE | Noted: 2022-12-30

## 2022-12-30 PROCEDURE — 99223 1ST HOSP IP/OBS HIGH 75: CPT | Performed by: HOSPITALIST

## 2022-12-30 PROCEDURE — 99223 1ST HOSP IP/OBS HIGH 75: CPT | Performed by: OTHER

## 2022-12-30 PROCEDURE — 70553 MRI BRAIN STEM W/O & W/DYE: CPT | Performed by: OTHER

## 2022-12-30 PROCEDURE — 70543 MRI ORBT/FAC/NCK W/O &W/DYE: CPT | Performed by: OTHER

## 2022-12-30 RX ORDER — GABAPENTIN 300 MG/1
300 CAPSULE ORAL 3 TIMES DAILY
Status: DISCONTINUED | OUTPATIENT
Start: 2022-12-30 | End: 2023-01-04

## 2022-12-30 RX ORDER — ONDANSETRON 2 MG/ML
4 INJECTION INTRAMUSCULAR; INTRAVENOUS EVERY 6 HOURS PRN
Status: DISCONTINUED | OUTPATIENT
Start: 2022-12-30 | End: 2023-01-04

## 2022-12-30 RX ORDER — POLYETHYLENE GLYCOL 3350 17 G/17G
17 POWDER, FOR SOLUTION ORAL DAILY PRN
Status: DISCONTINUED | OUTPATIENT
Start: 2022-12-30 | End: 2023-01-04

## 2022-12-30 RX ORDER — BISACODYL 10 MG
10 SUPPOSITORY, RECTAL RECTAL
Status: DISCONTINUED | OUTPATIENT
Start: 2022-12-30 | End: 2023-01-04

## 2022-12-30 RX ORDER — PROCHLORPERAZINE EDISYLATE 5 MG/ML
5 INJECTION INTRAMUSCULAR; INTRAVENOUS EVERY 8 HOURS PRN
Status: DISCONTINUED | OUTPATIENT
Start: 2022-12-30 | End: 2023-01-04

## 2022-12-30 RX ORDER — DIPHENHYDRAMINE HYDROCHLORIDE 50 MG/ML
20 INJECTION, SOLUTION INTRAMUSCULAR; INTRAVENOUS
Status: COMPLETED | OUTPATIENT
Start: 2022-12-30 | End: 2022-12-30

## 2022-12-30 RX ORDER — ENOXAPARIN SODIUM 100 MG/ML
40 INJECTION SUBCUTANEOUS DAILY
Status: DISCONTINUED | OUTPATIENT
Start: 2022-12-30 | End: 2023-01-04

## 2022-12-30 RX ORDER — ACETAMINOPHEN 500 MG
500 TABLET ORAL EVERY 4 HOURS PRN
Status: DISCONTINUED | OUTPATIENT
Start: 2022-12-30 | End: 2023-01-04

## 2022-12-30 RX ORDER — PANTOPRAZOLE SODIUM 40 MG/1
40 TABLET, DELAYED RELEASE ORAL
Status: DISCONTINUED | OUTPATIENT
Start: 2022-12-31 | End: 2023-01-04

## 2022-12-30 RX ORDER — SENNOSIDES 8.6 MG
17.2 TABLET ORAL NIGHTLY PRN
Status: DISCONTINUED | OUTPATIENT
Start: 2022-12-30 | End: 2023-01-04

## 2022-12-30 RX ORDER — SODIUM PHOSPHATE, DIBASIC AND SODIUM PHOSPHATE, MONOBASIC 7; 19 G/133ML; G/133ML
1 ENEMA RECTAL ONCE AS NEEDED
Status: DISCONTINUED | OUTPATIENT
Start: 2022-12-30 | End: 2023-01-04

## 2022-12-30 RX ORDER — MELATONIN
3 NIGHTLY PRN
Status: DISCONTINUED | OUTPATIENT
Start: 2022-12-30 | End: 2023-01-04

## 2022-12-30 RX ORDER — LORAZEPAM 2 MG/ML
0.5 INJECTION INTRAMUSCULAR
Status: COMPLETED | OUTPATIENT
Start: 2022-12-30 | End: 2022-12-30

## 2022-12-30 NOTE — PROGRESS NOTES
22 1109   Over the last 2 weeks, how often have you been bothered by any of the following problems? Little interest or pleasure in doing things 0   Feeling down, depressed, or hopeless 0   Trouble falling or staying asleep, or sleeping too much 1   Feeling tired or having little energy 1   Poor appetite or overeating 0   Feeling bad about yourself - or that you are a failure or have let yourself or your family down 0   Trouble concentrating on things, such as reading the newspaper or watching television 0   Moving or speaking so slowly that other people could have noticed. Or the opposite - being so fidgety or restless that you have been moving around a lot more than usual 0   Thoughts that you would be better off dead, or of hurting yourself in some way 0   PHQ-9 TOTAL SCORE [address]  [facility] Resource Referral Counselor Note    Leslie Marcos Patient Status:  Inpatient    1975 MRN [id number]   [facility] 3NE-A Attending Lore Gutierrez MD   Hosp Day # 0 PCP None Pcp       S(subjective) The patient states he is mentally doing fine. He said, his mother is the one that has mental problems. The patient said, she likes to verbally fight with him but he doesn't want to do that with her anymore. He said, when he was hear last time the psychiatrist told him to tell her what he needs to but don't get into arguments with her. The patient denies any suicidal thoughts. O(objective) The patient is alert and oriented x4. His mood and affect is wnl. A(assessment) The phq9 was completed. P(plan) The patient said, he doesn't need any mental health provider.         Dalton Patricio RN  2022  11:10 AM

## 2022-12-30 NOTE — PROGRESS NOTES
NURSING ADMISSION NOTE      Patient admitted via Wheelchair  Oriented to room. Safety precautions initiated. Bed in low position. Call light in reach.     Pt alert and oriented x 4   Chronic pain with MS  RA  Reg/Gen Diet  Up with standby assist declines walker  PIV Left AC  IV Steroids

## 2022-12-31 LAB
ALBUMIN SERPL-MCNC: 3.2 G/DL (ref 3.4–5)
ALBUMIN/GLOB SERPL: 1.1 {RATIO} (ref 1–2)
ALP LIVER SERPL-CCNC: 90 U/L
ALT SERPL-CCNC: 32 U/L
ANION GAP SERPL CALC-SCNC: 7 MMOL/L (ref 0–18)
AST SERPL-CCNC: 10 U/L (ref 15–37)
BASOPHILS # BLD AUTO: 0.05 X10(3) UL (ref 0–0.2)
BASOPHILS NFR BLD AUTO: 0.2 %
BILIRUB SERPL-MCNC: 0.9 MG/DL (ref 0.1–2)
BUN BLD-MCNC: 19 MG/DL (ref 7–18)
CALCIUM BLD-MCNC: 8.8 MG/DL (ref 8.5–10.1)
CHLORIDE SERPL-SCNC: 102 MMOL/L (ref 98–112)
CO2 SERPL-SCNC: 24 MMOL/L (ref 21–32)
CREAT BLD-MCNC: 1.18 MG/DL
EOSINOPHIL # BLD AUTO: 0 X10(3) UL (ref 0–0.7)
EOSINOPHIL NFR BLD AUTO: 0 %
ERYTHROCYTE [DISTWIDTH] IN BLOOD BY AUTOMATED COUNT: 14.7 %
GFR SERPLBLD BASED ON 1.73 SQ M-ARVRAT: 78 ML/MIN/1.73M2 (ref 60–?)
GLOBULIN PLAS-MCNC: 3 G/DL (ref 2.8–4.4)
GLUCOSE BLD-MCNC: 288 MG/DL (ref 70–99)
GLUCOSE BLD-MCNC: 334 MG/DL (ref 70–99)
GLUCOSE BLD-MCNC: 355 MG/DL (ref 70–99)
HCT VFR BLD AUTO: 40.2 %
HGB BLD-MCNC: 13.8 G/DL
IMM GRANULOCYTES # BLD AUTO: 0.31 X10(3) UL (ref 0–1)
IMM GRANULOCYTES NFR BLD: 1 %
LYMPHOCYTES # BLD AUTO: 0.36 X10(3) UL (ref 1–4)
LYMPHOCYTES NFR BLD AUTO: 1.1 %
MCH RBC QN AUTO: 30.4 PG (ref 26–34)
MCHC RBC AUTO-ENTMCNC: 34.3 G/DL (ref 31–37)
MCV RBC AUTO: 88.5 FL
MONOCYTES # BLD AUTO: 0.76 X10(3) UL (ref 0.1–1)
MONOCYTES NFR BLD AUTO: 2.4 %
NEUTROPHILS # BLD AUTO: 30.86 X10 (3) UL (ref 1.5–7.7)
NEUTROPHILS # BLD AUTO: 30.86 X10(3) UL (ref 1.5–7.7)
NEUTROPHILS NFR BLD AUTO: 95.3 %
OSMOLALITY SERPL CALC.SUM OF ELEC: 291 MOSM/KG (ref 275–295)
PLATELET # BLD AUTO: 289 10(3)UL (ref 150–450)
POTASSIUM SERPL-SCNC: 4.3 MMOL/L (ref 3.5–5.1)
PROT SERPL-MCNC: 6.2 G/DL (ref 6.4–8.2)
RBC # BLD AUTO: 4.54 X10(6)UL
SODIUM SERPL-SCNC: 133 MMOL/L (ref 136–145)
VIT B12 SERPL-MCNC: 399 PG/ML (ref 193–986)
WBC # BLD AUTO: 30 X10(3) UL (ref 4–11)

## 2022-12-31 PROCEDURE — 99232 SBSQ HOSP IP/OBS MODERATE 35: CPT | Performed by: HOSPITALIST

## 2022-12-31 PROCEDURE — 99233 SBSQ HOSP IP/OBS HIGH 50: CPT | Performed by: OTHER

## 2022-12-31 RX ORDER — NICOTINE POLACRILEX 4 MG
15 LOZENGE BUCCAL
Status: DISCONTINUED | OUTPATIENT
Start: 2022-12-31 | End: 2023-01-04

## 2022-12-31 RX ORDER — NICOTINE POLACRILEX 4 MG
30 LOZENGE BUCCAL
Status: DISCONTINUED | OUTPATIENT
Start: 2022-12-31 | End: 2023-01-04

## 2022-12-31 RX ORDER — DEXTROSE MONOHYDRATE 25 G/50ML
50 INJECTION, SOLUTION INTRAVENOUS
Status: DISCONTINUED | OUTPATIENT
Start: 2022-12-31 | End: 2023-01-04

## 2022-12-31 NOTE — PLAN OF CARE
Assumed care at 0730. A&O 4. Room air. Regular diet. No tele. VTE- Lovenox. See MAR for administration details. PT eval completed today and recommending MELISSA. See note for more details. Standby assist and patient refusing to use a walker, poor safety awareness due to decreased vision ability. Bed alarm on. Pain meds given this shift. See MAR for more details.  called this RN to let them know about multiple meals being placed. Pt oriented to the room, safety prec initiated, bed is in the lowest position and call light is within reach. Pt updated on the plan of care. Continued to monitor. 1759: MD notified of increased WBC and glucose. See managed orders and results for more details. Insulin added. See MAR for insulin administration.      Problem: SKIN/TISSUE INTEGRITY - ADULT  Goal: Skin integrity remains intact  Description: INTERVENTIONS  - Assess and document risk factors for pressure ulcer development  - Assess and document skin integrity  - Monitor for areas of redness and/or skin breakdown  - Initiate interventions, skin care algorithm/standards of care as needed  Outcome: Progressing     Problem: MUSCULOSKELETAL - ADULT  Goal: Return mobility to safest level of function  Description: INTERVENTIONS:  - Assess patient stability and activity tolerance for standing, transferring and ambulating w/ or w/o assistive devices  - Assist with transfers and ambulation using safe patient handling equipment as needed  - Ensure adequate protection for wounds/incisions during mobilization  - Obtain PT/OT consults as needed  - Advance activity as appropriate  - Communicate ordered activity level and limitations with patient/family  Outcome: Progressing     Problem: Impaired Functional Mobility  Goal: Achieve highest/safest level of mobility/gait  Description: Interventions:  - Assess patient's functional ability and stability  - Promote increasing activity/tolerance for mobility and gait  - Educate and engage patient/family in tolerated activity level and precautions  Outcome: Progressing     Problem: Impaired Activities of Daily Living  Goal: Achieve highest/safest level of independence in self care  Description: Interventions:  - Assess ability and encourage patient to participate in ADLs to maximize function  - Promote sitting position while performing ADLs such as feeding, grooming, and bathing  - Educate and encourage patient/family in tolerated functional activity level and precautions during self-care  Outcome: Progressing

## 2022-12-31 NOTE — PROGRESS NOTES
Dx: Decreased vision in both eyes/MS exacerbation. A & O x 3. Hyper-verbal.  Denies pain, but complains of numbness and tingling to bilateral upper and lower extremities. On R/A. Lung sounds clear. Regular diet. Excellent appetite. Able to feed self. IV solumedrol well tolerated. PIV to LFA -S/L. Up to bathroom with 1-person SBA. Refused the walker when offered. BM x 1 overnight. Continent bowel and bladder. No acute events. Fall precautions in place.

## 2023-01-01 ENCOUNTER — APPOINTMENT (OUTPATIENT)
Dept: CT IMAGING | Facility: HOSPITAL | Age: 48
End: 2023-01-01
Attending: INTERNAL MEDICINE
Payer: MEDICAID

## 2023-01-01 LAB
BASOPHILS # BLD AUTO: 0.04 X10(3) UL (ref 0–0.2)
BASOPHILS NFR BLD AUTO: 0.2 %
EOSINOPHIL # BLD AUTO: 0 X10(3) UL (ref 0–0.7)
EOSINOPHIL NFR BLD AUTO: 0 %
ERYTHROCYTE [DISTWIDTH] IN BLOOD BY AUTOMATED COUNT: 14.8 %
EST. AVERAGE GLUCOSE BLD GHB EST-MCNC: 134 MG/DL (ref 68–126)
GLUCOSE BLD-MCNC: 224 MG/DL (ref 70–99)
GLUCOSE BLD-MCNC: 260 MG/DL (ref 70–99)
GLUCOSE BLD-MCNC: 307 MG/DL (ref 70–99)
GLUCOSE BLD-MCNC: 322 MG/DL (ref 70–99)
HBA1C MFR BLD: 6.3 % (ref ?–5.7)
HCT VFR BLD AUTO: 37.7 %
HGB BLD-MCNC: 13 G/DL
IMM GRANULOCYTES # BLD AUTO: 0.35 X10(3) UL (ref 0–1)
IMM GRANULOCYTES NFR BLD: 1.3 %
LYMPHOCYTES # BLD AUTO: 0.44 X10(3) UL (ref 1–4)
LYMPHOCYTES NFR BLD AUTO: 1.7 %
MCH RBC QN AUTO: 30.8 PG (ref 26–34)
MCHC RBC AUTO-ENTMCNC: 34.5 G/DL (ref 31–37)
MCV RBC AUTO: 89.3 FL
MONOCYTES # BLD AUTO: 0.53 X10(3) UL (ref 0.1–1)
MONOCYTES NFR BLD AUTO: 2 %
NEUTROPHILS # BLD AUTO: 24.93 X10 (3) UL (ref 1.5–7.7)
NEUTROPHILS # BLD AUTO: 24.93 X10(3) UL (ref 1.5–7.7)
NEUTROPHILS NFR BLD AUTO: 94.8 %
PLATELET # BLD AUTO: 251 10(3)UL (ref 150–450)
RBC # BLD AUTO: 4.22 X10(6)UL
WBC # BLD AUTO: 26.3 X10(3) UL (ref 4–11)

## 2023-01-01 PROCEDURE — 70450 CT HEAD/BRAIN W/O DYE: CPT | Performed by: INTERNAL MEDICINE

## 2023-01-01 PROCEDURE — 99232 SBSQ HOSP IP/OBS MODERATE 35: CPT | Performed by: INTERNAL MEDICINE

## 2023-01-01 RX ORDER — HYDROCODONE BITARTRATE AND ACETAMINOPHEN 5; 325 MG/1; MG/1
2 TABLET ORAL EVERY 4 HOURS PRN
Status: DISCONTINUED | OUTPATIENT
Start: 2023-01-01 | End: 2023-01-04

## 2023-01-01 RX ORDER — HYDROCODONE BITARTRATE AND ACETAMINOPHEN 5; 325 MG/1; MG/1
1 TABLET ORAL EVERY 4 HOURS PRN
Status: DISCONTINUED | OUTPATIENT
Start: 2023-01-01 | End: 2023-01-04

## 2023-01-01 NOTE — PLAN OF CARE
Pt is aox4. States no pain. Medicated per orders. IV saline locked. UP with assist to bathroom. Appetite excellent - ordered 2 dinner trays. Unable to take accucheck as scheduled because pt was always eating (dinner/s, grape, brownies, several sodas). Indicated to patient that he should be more mindful of what he eats, especially since he is getting steroids. Insulin coverage per protocol.        Problem: SKIN/TISSUE INTEGRITY - ADULT  Goal: Skin integrity remains intact  Description: INTERVENTIONS  - Assess and document risk factors for pressure ulcer development  - Assess and document skin integrity  - Monitor for areas of redness and/or skin breakdown  - Initiate interventions, skin care algorithm/standards of care as needed  Outcome: Progressing     Problem: MUSCULOSKELETAL - ADULT  Goal: Return mobility to safest level of function  Description: INTERVENTIONS:  - Assess patient stability and activity tolerance for standing, transferring and ambulating w/ or w/o assistive devices  - Assist with transfers and ambulation using safe patient handling equipment as needed  - Ensure adequate protection for wounds/incisions during mobilization  - Obtain PT/OT consults as needed  - Advance activity as appropriate  - Communicate ordered activity level and limitations with patient/family  Outcome: Progressing     Problem: Impaired Functional Mobility  Goal: Achieve highest/safest level of mobility/gait  Description: Interventions:  - Assess patient's functional ability and stability  - Promote increasing activity/tolerance for mobility and gait  - Educate and engage patient/family in tolerated activity level and precautions    Outcome: Progressing     Problem: Impaired Activities of Daily Living  Goal: Achieve highest/safest level of independence in self care  Description: Interventions:  - Assess ability and encourage patient to participate in ADLs to maximize function  - Promote sitting position while performing ADLs such as feeding, grooming, and bathing  - Educate and encourage patient/family in tolerated functional activity level and precautions during self-care    Outcome: Progressing

## 2023-01-01 NOTE — PLAN OF CARE
[facility] care 0730. Alert and oriented x4. Impulsive getting out of bed. Accu checks. Blind left eye. Right eye blurriness. Contacts not in. Regular diet. RA. No tele. Lovenox for VTE prophylaxis. Fall precautions   PIV KVO. STBY   PT recommending AR. Electrolyte NC protocol. Continue to monitor pt.        Problem: SKIN/TISSUE INTEGRITY - ADULT  Goal: Skin integrity remains intact  Description: INTERVENTIONS  - Assess and document risk factors for pressure ulcer development  - Assess and document skin integrity  - Monitor for areas of redness and/or skin breakdown  - Initiate interventions, skin care algorithm/standards of care as needed  Outcome: Progressing     Problem: MUSCULOSKELETAL - ADULT  Goal: Return mobility to safest level of function  Description: INTERVENTIONS:  - Assess patient stability and activity tolerance for standing, transferring and ambulating w/ or w/o assistive devices  - Assist with transfers and ambulation using safe patient handling equipment as needed  - Ensure adequate protection for wounds/incisions during mobilization  - Obtain PT/OT consults as needed  - Advance activity as appropriate  - Communicate ordered activity level and limitations with patient/family  Outcome: Progressing     Problem: Impaired Functional Mobility  Goal: Achieve highest/safest level of mobility/gait  Description: Interventions:  - Assess patient's functional ability and stability  - Promote increasing activity/tolerance for mobility and gait  - Educate and engage patient/family in tolerated activity level and precautions  Outcome: Progressing     Problem: Impaired Activities of Daily Living  Goal: Achieve highest/safest level of independence in self care  Description: Interventions:  - Assess ability and encourage patient to participate in ADLs to maximize function  - Promote sitting position while performing ADLs such as feeding, grooming, and bathing  - Educate and encourage patient/family in tolerated functional activity level and precautions during self-care  Outcome: Progressing

## 2023-01-02 ENCOUNTER — APPOINTMENT (OUTPATIENT)
Dept: GENERAL RADIOLOGY | Facility: HOSPITAL | Age: 48
End: 2023-01-02
Attending: STUDENT IN AN ORGANIZED HEALTH CARE EDUCATION/TRAINING PROGRAM
Payer: MEDICAID

## 2023-01-02 LAB
GLUCOSE BLD-MCNC: 225 MG/DL (ref 70–99)
GLUCOSE BLD-MCNC: 227 MG/DL (ref 70–99)
GLUCOSE BLD-MCNC: 238 MG/DL (ref 70–99)
GLUCOSE BLD-MCNC: 331 MG/DL (ref 70–99)

## 2023-01-02 PROCEDURE — 99233 SBSQ HOSP IP/OBS HIGH 50: CPT | Performed by: OTHER

## 2023-01-02 PROCEDURE — 73080 X-RAY EXAM OF ELBOW: CPT | Performed by: STUDENT IN AN ORGANIZED HEALTH CARE EDUCATION/TRAINING PROGRAM

## 2023-01-02 PROCEDURE — 99232 SBSQ HOSP IP/OBS MODERATE 35: CPT | Performed by: INTERNAL MEDICINE

## 2023-01-02 RX ORDER — PREDNISONE 20 MG/1
60 TABLET ORAL
Status: DISCONTINUED | OUTPATIENT
Start: 2023-01-02 | End: 2023-01-04

## 2023-01-02 NOTE — PROGRESS NOTES
Pt awake and alert. No tele order. RA. Back from [facility]. Pt c/o pain to bilateral elbows. L>R. Open laceration seen to L elbow. No active bleeding seen. Steri strips and gauze placed. Ice pack applied. MD sommer. Norco PRN ordered and xray to bilateral elbows placed. Plan for xray to occur between 4833-3501. Scheduled. IV steroid. Accuchecks.

## 2023-01-02 NOTE — CM/SW NOTE
Patient is a 42 y/o male who admitted with MS exacerbation. PT recommending acute rehab, awaiting OT recommendations and PM&R consult. SW will continue to follow.     BINU Cohen  Discharge Planner  [phone]

## 2023-01-02 NOTE — OCCUPATIONAL THERAPY NOTE
OT ordered, chart reviewed, pt with pending XRays 2/2 fall in shower last night, OT will follow up after XRays have been completed.

## 2023-01-02 NOTE — CM/SW NOTE
MSW spoke to pt about discharge, he does not want an \"old person home\". We discussed difference between MELISSA and ACR. Pt wants a referral to [facility]. MSW sent referral in [address]. MSW asked [facility] to start insurance Arlene Appl.

## 2023-01-02 NOTE — PROGRESS NOTES
Significant Event - Fall Note    Date/Time of Fall: January 1, 2023 at [address] completed: Yes    Description of patient fall:     Patient fell from: Standing     Activity when fall occurred: Showering or bathing     Where did fall occur: Bathroom     Was the fall assisted: Found on floor/unassisted to floor    Who witnessed the fall: Unwitnessed    Patient narrative of fall:     Staff narrative of fall: Pt called on the call light reported he had slipped and fell coming out of the bathroom from the shower. Pt reported he asked for towlets to wash up in the sink but only received towels so decided to get in the shower without staff knowing instead. Pt reported he fell on his back and his his head and hurt his elbows. RN discussed with patient that he has been told he is a fall risk and needs to call or let staff know if he will be getting in the shower because he is a fall risk and his IV dressing now is coming off d/t no one wrapping it. Pt got angry with staff stating he was on the call light and waited over an hour for hand towels. RN had been near the call light and he did not call until his fall. MD notified. CT scan of head placed and fall precautions in place. RN educated patient on calling and not getting out of bed. Pt had cut to left elbow and gauze placed.      Name of Provider notified of fall: Primus Arena    Family notification: Patient declined    Factors contributing to fall:     Physical: Visual impairment     Psychological: Alert     Environmental: Wet floor     Medications received in the past 8 hours:   Medication(s) Administered in past 8 Hours from 01/01/2023 1109 to 01/01/2023 1909     Date/Time Order Dose Route Action Action by Comments    01/01/2023 1559 CST gabapentin (Neurontin) cap 300 mg 300 mg Oral Given Maira Courtney --    01/01/2023 1223 CST insulin aspart (NovoLOG) 100 Units/mL FlexPen 1-20 Units 6 Units Subcutaneous Given Maira Courtney --    01/01/2023 1713 CST insulin aspart (NovoLOG) 100 Units/mL FlexPen 1-20 Units 4 Units Subcutaneous Given Maira Courtney 260          Was patient identified as high fall risk prior to fall:         Fall Risk Level: High Fall Risk                      What interventions were in place prior to fall: Bed alarm, Bed in lowest position, Call light within reach, [place] footwear and Rounding    Interventions post fall: Bed alarm    Additional comments:  CT head to be completed.

## 2023-01-03 ENCOUNTER — APPOINTMENT (OUTPATIENT)
Dept: PHYSICAL THERAPY | Facility: HOSPITAL | Age: 48
End: 2023-01-03
Attending: Other
Payer: MEDICAID

## 2023-01-03 LAB
GLUCOSE BLD-MCNC: 110 MG/DL (ref 70–99)
GLUCOSE BLD-MCNC: 194 MG/DL (ref 70–99)
GLUCOSE BLD-MCNC: 204 MG/DL (ref 70–99)
GLUCOSE BLD-MCNC: 258 MG/DL (ref 70–99)

## 2023-01-03 PROCEDURE — 99232 SBSQ HOSP IP/OBS MODERATE 35: CPT | Performed by: INTERNAL MEDICINE

## 2023-01-03 NOTE — PLAN OF CARE
Assumed care of patient @ 0730. No acute distress noted. A&Ox4. VSS on Ra. Non tele. Open laceration to L elbow. Steri strips and gauze placed. PRN norco given. PO prednisone. QID accuchecks. General diet. Safety precautions in place. SBA to BR.    Problem: Patient/Family Goals  Goal: Patient/Family Long Term Goal  Description: Patient's Long Term Goal: Increase strength  Interventions:  - Acute rehab  - high dose IV solumedrol  - See additional Care Plan goals for specific interventions  Outcome: Progressing  Goal: Patient/Family Short Term Goal  Description: Patient's Short Term Goal: pain management  sx  Interventions:   - prn norco  - See additional Care Plan goals for specific interventions  Outcome: Progressing  Problem: SKIN/TISSUE INTEGRITY - ADULT  Goal: Skin integrity remains intact  Description: INTERVENTIONS  - Assess and document risk factors for pressure ulcer development  - Assess and document skin integrity  - Monitor for areas of redness and/or skin breakdown  - Initiate interventions, skin care algorithm/standards of care as needed  Outcome: Progressing  Problem: MUSCULOSKELETAL - ADULT  Goal: Return mobility to safest level of function  Description: INTERVENTIONS:  - Assess patient stability and activity tolerance for standing, transferring and ambulating w/ or w/o assistive devices  - Assist with transfers and ambulation using safe patient handling equipment as needed  - Ensure adequate protection for wounds/incisions during mobilization  - Obtain PT/OT consults as needed  - Advance activity as appropriate  - Communicate ordered activity level and limitations with patient/family  Outcome: Progressing  Problem: Impaired Functional Mobility  Goal: Achieve highest/safest level of mobility/gait  Description: Interventions:  - Assess patient's functional ability and stability  - Promote increasing activity/tolerance for mobility and gait  - Educate and engage patient/family in tolerated activity level and precautions  Outcome: Progressing  Problem: Impaired Activities of Daily Living  Goal: Achieve highest/safest level of independence in self care  Description: Interventions:  - Assess ability and encourage patient to participate in ADLs to maximize function  - Promote sitting position while performing ADLs such as feeding, grooming, and bathing  - Educate and encourage patient/family in tolerated functional activity level and precautions during self-care  Outcome: Progressing

## 2023-01-03 NOTE — PLAN OF CARE
Assumed care 0730. Alert and oriented x4. Accu checks. Blind left eye. Right eye blurriness. Contacts not in. Regular diet. RA. No tele. Lovenox for VTE prophylaxis. Axel Feathers in bathroom before shift. X-ray of both Elbows were negative. Fall precautions  PIV KVO. Naveen Conn  PT recommending AR. Electrolyte NC protocol. Problem: Patient/Family Goals  Goal: Patient/Family Long Term Goal  Description: Patient's Long Term Goal: be discharged     Interventions:  - follow plan of care  - See additional Care Plan goals for specific interventions  Outcome: Progressing  Goal: Patient/Family Short Term Goal  Description: Patient's Short Term Goal: manage MS symptoms    Interventions:   - follow plan of care. - See additional Care Plan goals for specific interventions  Outcome: Progressing   Continue to monitor pt.

## 2023-01-03 NOTE — PLAN OF CARE
Assumed care at 1930  A/Ox4, on room air, no telemetry  Regular diet, qID accuchecks  Non cardiac electrolyte protocol  VTE prophylaxis with lovenox  PRN norco given per mar  IV to left upper arm saline locked  Patient updated with plan of care  All needs met at this time     Problem: SKIN/TISSUE INTEGRITY - ADULT  Goal: Skin integrity remains intact  Description: INTERVENTIONS  - Assess and document risk factors for pressure ulcer development  - Assess and document skin integrity  - Monitor for areas of redness and/or skin breakdown  - Initiate interventions, skin care algorithm/standards of care as needed  Outcome: Progressing     Problem: MUSCULOSKELETAL - ADULT  Goal: Return mobility to safest level of function  Description: INTERVENTIONS:  - Assess patient stability and activity tolerance for standing, transferring and ambulating w/ or w/o assistive devices  - Assist with transfers and ambulation using safe patient handling equipment as needed  - Ensure adequate protection for wounds/incisions during mobilization  - Obtain PT/OT consults as needed  - Advance activity as appropriate  - Communicate ordered activity level and limitations with patient/family  Outcome: Progressing     Problem: Impaired Functional Mobility  Goal: Achieve highest/safest level of mobility/gait  Description: Interventions:  - Assess patient's functional ability and stability  - Promote increasing activity/tolerance for mobility and gait  - Educate and engage patient/family in tolerated activity level and precautions  - Recommend use of chair position in bed 3 times per day  Outcome: Progressing     Problem: Impaired Activities of Daily Living  Goal: Achieve highest/safest level of independence in self care  Description: Interventions:  - Assess ability and encourage patient to participate in ADLs to maximize function  - Promote sitting position while performing ADLs such as feeding, grooming, and bathing  - Educate and encourage patient/family in tolerated functional activity level and precautions during self-care  - Encourage patient to incorporate impaired side during daily activities to promote function  Outcome: Progressing

## 2023-01-03 NOTE — PHYSICAL THERAPY NOTE
PHYSICAL THERAPY TREATMENT NOTE - INPATIENT    Room Number: 3907/4621-O     Session: 1     Number of Visits to Meet Established Goals: 6    Presenting Problem: MS flare, visual changes, admitted for IV steroids  Co-Morbidities : MS     History related to current admission: Patient is a 41year old male admitted on 12/30/2022 from home for MS exacerbation, visual changes, IV steroid administration. Currently his left eye is dark and blurry, his right eye is just blurry. Pt with fall in bathroom on 1/1/23 after attempting to shower/wash up independently. Bilateral elbow xray and Brain CT negative. ASSESSMENT   Pt presents to therapy with decreased strength, functional mobility, endurance, balance, trunk control. These impairments contribute to limitations in walking, transfers, bed mobility. Pt with improved endurance today, however continues to demonstrate significantly decreased peggy and inability to perform dual-tasking (walking and talking) contributing to frequent pauses during ambulation in order to participate in conversation. Pt will continue to benefit from skilled IP PT to address impairments and functional limitations. DISCHARGE RECOMMENDATIONS  PT Discharge Recommendations: Acute rehabilitation;24 hour care/supervision     PLAN  PT Treatment Plan: Balance training;Stair training;Stoop training;Neuromuscular re-educate;Gait training;Family education;Patient education; Energy conservation; Endurance; Bed mobility; Coordination; Body mechanics  Rehab Potential : Fair  Frequency (Obs): 3-5x/week    CURRENT GOALS   Goal #1 Patient is able to demonstrate supine - sit EOB @ level: modified independent  MET   Goal #2 Patient is able to demonstrate transfers Sit to/from Stand at assistance level: modified independent     Goal #3 Patient is able to ambulate 100 feet with assist device: LRAD at assistance level: supervision     Goal #4 Pt is able to ascend and descend 12 stairs with supervision.     Goal #5 Goal #6    Goal Comments: Goals established on 2022    1/3/2023 Met goal #1, goals 2-4 in progress      SUBJECTIVE  \"I have to get better for my dog\"    OBJECTIVE  Precautions: Bed/chair alarm    WEIGHT BEARING RESTRICTION  Weight Bearing Restriction: None                PAIN ASSESSMENT   Ratin          BALANCE                                                                                                                       Static Sitting: Fair +  Dynamic Sitting: Fair +           Static Standing: Fair  Dynamic Standing: Poor +    ACTIVITY TOLERANCE                         O2 WALK         AM-PAC '6-Clicks' INPATIENT SHORT FORM - BASIC MOBILITY  How much difficulty does the patient currently have. .. Patient Difficulty: Turning over in bed (including adjusting bedclothes, sheets and blankets)?: None   Patient Difficulty: Sitting down on and standing up from a chair with arms (e.g., wheelchair, bedside commode, etc.): A Little   Patient Difficulty: Moving from lying on back to sitting on the side of the bed?: A Little   How much help from another person does the patient currently need. ..    Help from Another: Moving to and from a bed to a chair (including a wheelchair)?: A Little   Help from Another: Need to walk in hospital room?: A Little   Help from Another: Climbing 3-5 steps with a railing?: A Little       AM-PAC Score:  Raw Score: 19   Approx Degree of Impairment: 41.77%   Standardized Score (AM-PAC Scale): 45.44   CMS Modifier (G-Code): CK    FUNCTIONAL ABILITY STATUS  Gait Assessment   Functional Mobility/Gait Assessment  Gait Assistance: Supervision;Contact guard assist  Distance (ft): 10, 75, 10, 10 (frequent standing rest breaks/pauses during 75')  Assistive Device: Rolling walker  Pattern:  (significantly decreased peggy)    Skilled Therapy Provided    Bed Mobility:  Rolling: NT   Supine<>Sit: ind   Sit<>Supine: ind     Transfer Mobility:  Sit<>Stand: supervision   Stand<>Sit: supervision   Gait: supervision-CGA    Therapist's Comments: RN cleared for session. Pt agreeable for therapy, received in bathroom, on toilet. Pt utilizing wall and grab bars in bathroom to maintain balance. Able to ambulate to bed to prepare for therapy session, donned gait belt, and reviewed proper usage of RW. During ambulation pt demonstrate significantly decreased peggy and difficulties performing dual tasking (walking and talking) contributing to frequent rest breaks and pausing during ambulation in order to participate in conversation. Pt with improved ability to self-correct with RW usage, however pt continues to dislike RW due to claustrophobia. Pt able to return to bathroom and participated in standing balance while brushing teeth, did req LUE on counter for balance. Pt with frequent verbalization regarding motivation to improve. Instructed to call for nursing staff for any needs and OOB mobility. Patient End of Session: In bed;Needs met;Call light within reach;RN aware of session/findings; All patient questions and concerns addressed; Alarm set

## 2023-01-03 NOTE — CM/SW NOTE
1018am  MSW updated by [facility] admissions that pt is accepted.     4pm  MSW updated Pt that insurance Rosalio Patel is pending     Barrier to [employer]: [employer] pending

## 2023-01-04 ENCOUNTER — TELEPHONE (OUTPATIENT)
Dept: NEUROLOGY | Facility: CLINIC | Age: 48
End: 2023-01-04

## 2023-01-04 ENCOUNTER — TELEPHONE (OUTPATIENT)
Dept: CASE MANAGEMENT | Facility: HOSPITAL | Age: 48
End: 2023-01-04

## 2023-01-04 VITALS
BODY MASS INDEX: 31 KG/M2 | TEMPERATURE: 98 F | HEART RATE: 63 BPM | SYSTOLIC BLOOD PRESSURE: 109 MMHG | WEIGHT: 189.69 LBS | DIASTOLIC BLOOD PRESSURE: 64 MMHG | RESPIRATION RATE: 16 BRPM | OXYGEN SATURATION: 94 %

## 2023-01-04 DIAGNOSIS — G35 MULTIPLE SCLEROSIS EXACERBATION (HCC): Primary | ICD-10-CM

## 2023-01-04 LAB
GLUCOSE BLD-MCNC: 111 MG/DL (ref 70–99)
GLUCOSE BLD-MCNC: 196 MG/DL (ref 70–99)
SARS-COV-2 RNA RESP QL NAA+PROBE: NOT DETECTED

## 2023-01-04 PROCEDURE — 99239 HOSP IP/OBS DSCHRG MGMT >30: CPT | Performed by: INTERNAL MEDICINE

## 2023-01-04 NOTE — TELEPHONE ENCOUNTER
Received paperwork that [facility] is not in network with medicaid. Reached out to [facility]. They do ocrevus infusions. Faxed to [phone]. Paperwork included.  ocrevus start form on [employer]o health form, CBC, LOV, Ocrevus start form

## 2023-01-04 NOTE — PLAN OF CARE
Problem: Patient/Family Goals  Goal: Patient/Family Long Term Goal  Description: Patient's Long Term Goal: Discharge  Interventions:  - PO steroids  - See additional Care Plan goals for specific interventions  Outcome: Progressing    Problem: SKIN/TISSUE INTEGRITY - ADULT    Goal: Skin integrity remains intact  Description: INTERVENTIONS  - Assess and document risk factors for pressure ulcer development  - Assess and document skin integrity  - Monitor for areas of redness and/or skin breakdown  - Initiate interventions, skin care algorithm/standards of care as needed  Outcome: Progressing     Problem: MUSCULOSKELETAL - ADULT  Goal: Return mobility to safest level of function  Description: INTERVENTIONS:  - Assess patient stability and activity tolerance for standing, transferring and ambulating w/ or w/o assistive devices  - Assist with transfers and ambulation using safe patient handling equipment as needed  - Ensure adequate protection for wounds/incisions during mobilization  - Obtain PT/OT consults as needed  - Advance activity as appropriate  - Communicate ordered activity level and limitations with patient/family  Outcome: Progressing     Problem: Impaired Functional Mobility  Goal: Achieve highest/safest level of mobility/gait  Description: Interventions:  - Assess patient's functional ability and stability  - Promote increasing activity/tolerance for mobility and gait  - Educate and engage patient/family in tolerated activity level and precautions    Outcome: Progressing     Problem: Impaired Activities of Daily Living  Goal: Achieve highest/safest level of independence in self care  Description: Interventions:  - Assess ability and encourage patient to participate in ADLs to maximize function  - Promote sitting position while performing ADLs such as feeding, grooming, and bathing  - Educate and encourage patient/family in tolerated functional activity level and precautions during self-care    Outcome: Progressing       Goal: Patient/Family Short Term Goal  Description: Patient's Short Term Goal: Comfort    Interventions:   - Pain meds as needed  - See additional Care Plan goals for specific interventions  Outcome: Progressing     A&Ox4. VSS. RA. . GI: Abdomen soft, nondistended. Denies nausea. : Voids. Pain controlled with PRN pain medications  Up with standby assist, fall precautions in place. Diet:general  IV saline locked. All appropriate safety measures in place. All questions and concerns addressed.  Will continue to monitor

## 2023-01-04 NOTE — PROGRESS NOTES
Report given to [facility] SIMONA DICKEY RN at [facility], all questions answered,

## 2023-01-04 NOTE — CM/SW NOTE
Expected Discharge Plan:    Destination:  Acute Rehab: [facility]  Call for report to: [phone]  Transportation provider-Edjoselyn Medicar    DC Time:  1pm  Current Barriers to d/c: Medical Clearance; Pt/Family aware of plan: Yes , mom at bedside  [facility] aware of dc plan: Patient discussed in care rounds.   PCS form completed and faxed to [address] [place] [address]

## 2023-01-04 NOTE — TELEPHONE ENCOUNTER
Spoke to [facility] NP at [employer] She asked about patient's infusions and if he was scheduled for one. Discussed that patients insurance is medicaid. [facility] is out of network. Advised that we sent a referral to [facility] and will follow up on approval.     Conner Elizabeth we will update her on patients application for ocrevus.

## 2023-01-04 NOTE — PROGRESS NOTES
NURSING DISCHARGE NOTE    Discharged Rehab facility via Wheelchair. Accompanied by EMS  Belongings Taken by patient/family. Patient discharged via [facility] to [facility] in stable condition.

## 2023-01-04 NOTE — CM/SW NOTE
Jeanne REYNAGA case manager with [facility] offering discharge planning assistance. If needed please call back at [phone].

## 2023-01-04 NOTE — TELEPHONE ENCOUNTER
RN from [facility] calling wanting to speak to a RN in regards to pt and infusion; please advise

## 2023-01-04 NOTE — PROGRESS NOTES
Attempted to call report to Leticia Hernandez. Per staff at [facility] there, receiving RN is at lunch.  Callback number provided and notified staff of pickup time of 1pm.

## 2023-01-04 NOTE — PLAN OF CARE
Assumed care at 1930  A/Ox4, on room air, no telemetry  Regular diet, qID accuchecks  Non cardiac electrolyte protocol  VTE prophylaxis with lovenox  IV to left upper arm saline locked  Patient updated with plan of care  All needs met at this time    PRN norco given per mar     Problem: SKIN/TISSUE INTEGRITY - ADULT  Goal: Skin integrity remains intact  Description: INTERVENTIONS  - Assess and document risk factors for pressure ulcer development  - Assess and document skin integrity  - Monitor for areas of redness and/or skin breakdown  - Initiate interventions, skin care algorithm/standards of care as needed  Outcome: Progressing     Problem: MUSCULOSKELETAL - ADULT  Goal: Return mobility to safest level of function  Description: INTERVENTIONS:  - Assess patient stability and activity tolerance for standing, transferring and ambulating w/ or w/o assistive devices  - Assist with transfers and ambulation using safe patient handling equipment as needed  - Ensure adequate protection for wounds/incisions during mobilization  - Obtain PT/OT consults as needed  - Advance activity as appropriate  - Communicate ordered activity level and limitations with patient/family  Outcome: Progressing     Problem: Impaired Functional Mobility  Goal: Achieve highest/safest level of mobility/gait  Description: Interventions:  - Assess patient's functional ability and stability  - Promote increasing activity/tolerance for mobility and gait  - Educate and engage patient/family in tolerated activity level and precautions  - Recommend use of chair position in bed 3 times per day  Outcome: Progressing     Problem: Impaired Activities of Daily Living  Goal: Achieve highest/safest level of independence in self care  Description: Interventions:  - Assess ability and encourage patient to participate in ADLs to maximize function  - Promote sitting position while performing ADLs such as feeding, grooming, and bathing  - Educate and encourage patient/family in tolerated functional activity level and precautions during self-care  - Encourage patient to incorporate impaired side during daily activities to promote function  Outcome: Progressing

## 2023-01-05 ENCOUNTER — TELEPHONE (OUTPATIENT)
Dept: PHYSICAL THERAPY | Facility: HOSPITAL | Age: 48
End: 2023-01-05

## 2023-01-05 ENCOUNTER — TELEPHONE (OUTPATIENT)
Dept: SURGERY | Facility: CLINIC | Age: 48
End: 2023-01-05

## 2023-01-05 ENCOUNTER — PATIENT OUTREACH (OUTPATIENT)
Dept: CASE MANAGEMENT | Age: 48
End: 2023-01-05

## 2023-01-05 NOTE — PAYOR COMM NOTE
Discharge Notification    Patient Name: Daryl Click: [place] [id number] [facility] #: [id number]  Authorization Number: [id number]  Admit Date/Time: 12/30/2022 9:03 AM  Discharge Date/Time: 1/4/2023 1:17 PM

## 2023-01-05 NOTE — TELEPHONE ENCOUNTER
Received call regarding patient unable to receive infusion from [facility]. Harman Rowan stated she will reach out to [facility] and [facility] if they are within patients network.

## 2023-01-05 NOTE — TELEPHONE ENCOUNTER
Spoke to patient mother. Confirmed patient is at acute rehab[facility]. Is expected to be there for 3 weeks per her report. Discussed cancellation of other scheduled appointments, I will cancel for her.

## 2023-01-05 NOTE — TELEPHONE ENCOUNTER
Spoke to Jes from [facility]. He states they did receive the referral yesterday. And have the medication pending at this time. Approval can take 48 hours or more for insurance to approve. Inquired if they except medicaid and was told yes. Will await further details.

## 2023-01-06 ENCOUNTER — APPOINTMENT (OUTPATIENT)
Dept: PHYSICAL THERAPY | Facility: HOSPITAL | Age: 48
End: 2023-01-06
Attending: Other
Payer: MEDICAID

## 2023-01-06 NOTE — PAYOR COMM NOTE
Discharge Notification    Patient Name: Ann Becerra: Z[place] [id number] [facility] #: [id number]  Authorization Number: [id number]  Admit Date/Time: 12/30/2022 9:03 AM  Discharge Date/Time: 1/4/2023 1:17 PM

## 2023-01-10 NOTE — TELEPHONE ENCOUNTER
Spoke to [facility] to find out referral status. Currently nothing is noted in their chart. He will reach out to the intake department to have someone call us back.

## 2023-01-11 ENCOUNTER — APPOINTMENT (OUTPATIENT)
Dept: PHYSICAL THERAPY | Facility: HOSPITAL | Age: 48
End: 2023-01-11
Attending: Other
Payer: MEDICAID

## 2023-01-12 ENCOUNTER — TELEPHONE (OUTPATIENT)
Dept: SURGERY | Facility: CLINIC | Age: 48
End: 2023-01-12

## 2023-01-12 NOTE — TELEPHONE ENCOUNTER
Patient's mom called she would like someone to give her call in regards to his medication. the patient is currently in a rehab center and he is not getting better. Please advise as soon as possible.

## 2023-01-12 NOTE — TELEPHONE ENCOUNTER
Called [facility] and spoke to [place].  He states the last note they received from Intake states medication is still pending approval. He transferred me to [address] [place] [address] where I left a message to return call regarding status of referral.

## 2023-01-12 NOTE — TELEPHONE ENCOUNTER
Reached out to [facility] to receive update on Ocrevus. See Alt TE 01/04/2023. Will update Mother with information when available.

## 2023-01-13 ENCOUNTER — APPOINTMENT (OUTPATIENT)
Dept: PHYSICAL THERAPY | Facility: HOSPITAL | Age: 48
End: 2023-01-13
Attending: Other
Payer: MEDICAID

## 2023-01-16 RX ORDER — DIPHENHYDRAMINE HYDROCHLORIDE, ZINC ACETATE 2; .1 G/100G; G/100G
CREAM TOPICAL
COMMUNITY
Start: 2023-01-13

## 2023-01-16 RX ORDER — ACETAMINOPHEN 325 MG/1
2 TABLET ORAL EVERY 4 HOURS PRN
COMMUNITY
Start: 2023-01-13

## 2023-01-16 RX ORDER — MELATONIN
1 NIGHTLY PRN
COMMUNITY
Start: 2023-01-13

## 2023-01-16 RX ORDER — ASCORBIC ACID 100 MG
1 TABLET,CHEWABLE ORAL DAILY
COMMUNITY
Start: 2023-01-14

## 2023-01-16 RX ORDER — PSEUDOEPHEDRINE HCL 30 MG
100 TABLET ORAL 2 TIMES DAILY
COMMUNITY
Start: 2023-01-13

## 2023-01-16 NOTE — TELEPHONE ENCOUNTER
Called [facility] and was told [facility] is not in patients network. They stated they sent the referral over to [facility] on Friday afternoon to see if they can cover medication. Called [facility] and they had no record of patient. Faxed referral, labs, face sheet, insurance information over to [facility]. Spoke to [facility] (nicholas) and he states they just received the order today. Chevy Nunn states it take 5-8 business days to get back. Will start PA for patient to receive ocrevus at [facility].

## 2023-01-17 ENCOUNTER — APPOINTMENT (OUTPATIENT)
Dept: PHYSICAL THERAPY | Facility: HOSPITAL | Age: 48
End: 2023-01-17
Attending: Other
Payer: MEDICAID

## 2023-01-18 ENCOUNTER — TELEPHONE (OUTPATIENT)
Dept: SURGERY | Facility: CLINIC | Age: 48
End: 2023-01-18

## 2023-01-18 RX ORDER — OCRELIZUMAB 300 MG/10ML
300 INJECTION INTRAVENOUS
Qty: 20 ML | Refills: 0 | Status: CANCELLED | OUTPATIENT
Start: 2023-01-18 | End: 2023-02-02

## 2023-01-18 NOTE — TELEPHONE ENCOUNTER
Upon [employer]iCore patient had approval [id number] for  Ocrevus valid from 01/12/23 through 07/11/23 to be completed by [facility]. Upon Epic review it was noted as:  [facility] is not in patients network    Called [employer] at [phone], spoke to [employer]    She was able to update facility to:  [facility]   NPI:  [id number]    Asked to have amended authorization faxed to [address] dept [phone]    This is currently on [employer]iCore:

## 2023-01-18 NOTE — TELEPHONE ENCOUNTER
Called [facility] and spoke to Irene regarding medication and place of care. Multiple times placed on hold to speak to a pharmacist. After 1 hour and 10 minutes spoke to Selam Otto pharmacist to update [facility] that medication will need to be sent to [facility] for infusions.

## 2023-01-18 NOTE — TELEPHONE ENCOUNTER
[facility] states needs the correct paperwork. To process the request. Please contact Lizette Bence to advise. Best  number [id number].

## 2023-01-20 ENCOUNTER — APPOINTMENT (OUTPATIENT)
Dept: PHYSICAL THERAPY | Facility: HOSPITAL | Age: 48
End: 2023-01-20
Attending: Other
Payer: MEDICAID

## 2023-01-23 ENCOUNTER — TELEPHONE (OUTPATIENT)
Dept: SURGERY | Facility: CLINIC | Age: 48
End: 2023-01-23

## 2023-01-23 NOTE — TELEPHONE ENCOUNTER
Received denial for Ocrevus from [employer]. Appeal letter pended for review and signature by Dr. Che Tejada.

## 2023-01-24 RX ORDER — METHYLPREDNISOLONE SODIUM SUCCINATE 125 MG/2ML
100 INJECTION, POWDER, LYOPHILIZED, FOR SOLUTION INTRAMUSCULAR; INTRAVENOUS ONCE
OUTPATIENT
Start: 2023-01-25

## 2023-01-24 RX ORDER — ACETAMINOPHEN 325 MG/1
650 TABLET ORAL ONCE
OUTPATIENT
Start: 2023-01-25

## 2023-01-24 RX ORDER — DIPHENHYDRAMINE HCL 25 MG
50 CAPSULE ORAL ONCE
OUTPATIENT
Start: 2023-01-25

## 2023-01-24 NOTE — TELEPHONE ENCOUNTER
Received authorization from [facility] for Traitify. Case Number [id number]  Requested Quantity: up to 4 vials per 180 days  Effective 01/01/2023 and ends 01/24/24    Call placed Miladis at the [facility] to advise patient will need to be scheduled. Advised that patient will have to have medication at center before being scheduled. Call placed to Cara. She updated on their end that we have received the PA. She states it can take up to 48 hours for their system to update. Once updated they will call the patient and advise shipping. Spoke to Nico and advised that the infusion will happen at the cancer center. Advised that once the medication is received they can schedule. Advised that [facility] will call to verify shipment of medication so she will need to make sure she answers. Medication will need to be shipped to the cancer center not this office.

## 2023-01-25 NOTE — TELEPHONE ENCOUNTER
Miladis from Cancer center V#[id number] calling to inform once [facility] gets authorization from pt to ship medication and office gets the medication they will schedule patient but will only be once they receive medication

## 2023-01-29 ENCOUNTER — TELEPHONE (OUTPATIENT)
Dept: NEUROLOGY | Facility: CLINIC | Age: 48
End: 2023-01-29

## 2023-01-29 NOTE — TELEPHONE ENCOUNTER
Patient calling answering service and saying arms and legs numbness has not improved, and the for the past couple of days, he is more unsteady. Recommended to go to the ED for eval of possible MS flare. However, patient would like to be directly admitted, and does not want to go to the ED. I recommended that he call back the clinic on Monday and discuss.

## 2023-01-30 ENCOUNTER — HOSPITAL ENCOUNTER (INPATIENT)
Facility: HOSPITAL | Age: 48
LOS: 1 days | Discharge: HOME HEALTH | End: 2023-01-31
Attending: HOSPITALIST | Admitting: HOSPITALIST
Payer: MEDICAID

## 2023-01-30 ENCOUNTER — HOSPITAL ENCOUNTER (OUTPATIENT)
Facility: HOSPITAL | Age: 48
Setting detail: OBSERVATION
Discharge: HOME HEALTH | End: 2023-01-31
Attending: HOSPITALIST | Admitting: HOSPITALIST
Payer: MEDICAID

## 2023-01-30 LAB
ALBUMIN SERPL-MCNC: 3.5 G/DL (ref 3.4–5)
ALBUMIN/GLOB SERPL: 1.2 {RATIO} (ref 1–2)
ALP LIVER SERPL-CCNC: 69 U/L
ALT SERPL-CCNC: 40 U/L
ANION GAP SERPL CALC-SCNC: 7 MMOL/L (ref 0–18)
AST SERPL-CCNC: 15 U/L (ref 15–37)
BASOPHILS # BLD AUTO: 0.05 X10(3) UL (ref 0–0.2)
BASOPHILS NFR BLD AUTO: 0.6 %
BILIRUB SERPL-MCNC: 0.1 MG/DL (ref 0.1–2)
BUN BLD-MCNC: 15 MG/DL (ref 7–18)
CALCIUM BLD-MCNC: 9.1 MG/DL (ref 8.5–10.1)
CHLORIDE SERPL-SCNC: 108 MMOL/L (ref 98–112)
CO2 SERPL-SCNC: 27 MMOL/L (ref 21–32)
CREAT BLD-MCNC: 1.12 MG/DL
EOSINOPHIL # BLD AUTO: 0.06 X10(3) UL (ref 0–0.7)
EOSINOPHIL NFR BLD AUTO: 0.7 %
ERYTHROCYTE [DISTWIDTH] IN BLOOD BY AUTOMATED COUNT: 13.4 %
GFR SERPLBLD BASED ON 1.73 SQ M-ARVRAT: 83 ML/MIN/1.73M2 (ref 60–?)
GLOBULIN PLAS-MCNC: 3 G/DL (ref 2.8–4.4)
GLUCOSE BLD-MCNC: 165 MG/DL (ref 70–99)
GLUCOSE BLD-MCNC: 188 MG/DL (ref 70–99)
GLUCOSE BLD-MCNC: 330 MG/DL (ref 70–99)
GLUCOSE BLD-MCNC: 82 MG/DL (ref 70–99)
HCT VFR BLD AUTO: 42 %
HGB BLD-MCNC: 14.2 G/DL
IMM GRANULOCYTES # BLD AUTO: 0.05 X10(3) UL (ref 0–1)
IMM GRANULOCYTES NFR BLD: 0.6 %
LYMPHOCYTES # BLD AUTO: 2.13 X10(3) UL (ref 1–4)
LYMPHOCYTES NFR BLD AUTO: 25.3 %
MCH RBC QN AUTO: 31.1 PG (ref 26–34)
MCHC RBC AUTO-ENTMCNC: 33.8 G/DL (ref 31–37)
MCV RBC AUTO: 92.1 FL
MONOCYTES # BLD AUTO: 0.64 X10(3) UL (ref 0.1–1)
MONOCYTES NFR BLD AUTO: 7.6 %
NEUTROPHILS # BLD AUTO: 5.48 X10 (3) UL (ref 1.5–7.7)
NEUTROPHILS # BLD AUTO: 5.48 X10(3) UL (ref 1.5–7.7)
NEUTROPHILS NFR BLD AUTO: 65.2 %
OSMOLALITY SERPL CALC.SUM OF ELEC: 299 MOSM/KG (ref 275–295)
PLATELET # BLD AUTO: 306 10(3)UL (ref 150–450)
POTASSIUM SERPL-SCNC: 3.7 MMOL/L (ref 3.5–5.1)
PROT SERPL-MCNC: 6.5 G/DL (ref 6.4–8.2)
RBC # BLD AUTO: 4.56 X10(6)UL
SODIUM SERPL-SCNC: 142 MMOL/L (ref 136–145)
WBC # BLD AUTO: 8.4 X10(3) UL (ref 4–11)

## 2023-01-30 PROCEDURE — 99223 1ST HOSP IP/OBS HIGH 75: CPT | Performed by: OTHER

## 2023-01-30 PROCEDURE — 99223 1ST HOSP IP/OBS HIGH 75: CPT | Performed by: INTERNAL MEDICINE

## 2023-01-30 RX ORDER — SODIUM PHOSPHATE, DIBASIC AND SODIUM PHOSPHATE, MONOBASIC 7; 19 G/133ML; G/133ML
1 ENEMA RECTAL ONCE AS NEEDED
Status: DISCONTINUED | OUTPATIENT
Start: 2023-01-30 | End: 2023-01-31

## 2023-01-30 RX ORDER — HYDROCODONE BITARTRATE AND ACETAMINOPHEN 5; 325 MG/1; MG/1
2 TABLET ORAL EVERY 4 HOURS PRN
Status: DISCONTINUED | OUTPATIENT
Start: 2023-01-30 | End: 2023-01-31

## 2023-01-30 RX ORDER — BISACODYL 10 MG
10 SUPPOSITORY, RECTAL RECTAL
Status: DISCONTINUED | OUTPATIENT
Start: 2023-01-30 | End: 2023-01-31

## 2023-01-30 RX ORDER — BENZONATATE 200 MG/1
200 CAPSULE ORAL 3 TIMES DAILY PRN
Status: DISCONTINUED | OUTPATIENT
Start: 2023-01-30 | End: 2023-01-31

## 2023-01-30 RX ORDER — GABAPENTIN 300 MG/1
300 CAPSULE ORAL 3 TIMES DAILY
Status: DISCONTINUED | OUTPATIENT
Start: 2023-01-30 | End: 2023-01-31

## 2023-01-30 RX ORDER — ACETAMINOPHEN 325 MG/1
650 TABLET ORAL EVERY 4 HOURS PRN
Status: DISCONTINUED | OUTPATIENT
Start: 2023-01-30 | End: 2023-01-31

## 2023-01-30 RX ORDER — NICOTINE POLACRILEX 4 MG
15 LOZENGE BUCCAL
Status: DISCONTINUED | OUTPATIENT
Start: 2023-01-30 | End: 2023-01-31

## 2023-01-30 RX ORDER — SENNOSIDES 8.6 MG
17.2 TABLET ORAL NIGHTLY PRN
Status: DISCONTINUED | OUTPATIENT
Start: 2023-01-30 | End: 2023-01-31

## 2023-01-30 RX ORDER — ALBUTEROL SULFATE 90 UG/1
2 AEROSOL, METERED RESPIRATORY (INHALATION) EVERY 4 HOURS PRN
Status: DISCONTINUED | OUTPATIENT
Start: 2023-01-30 | End: 2023-01-31

## 2023-01-30 RX ORDER — ALPRAZOLAM 0.25 MG/1
0.25 TABLET ORAL 2 TIMES DAILY PRN
Status: DISCONTINUED | OUTPATIENT
Start: 2023-01-30 | End: 2023-01-31

## 2023-01-30 RX ORDER — MELATONIN
3 NIGHTLY PRN
Status: DISCONTINUED | OUTPATIENT
Start: 2023-01-30 | End: 2023-01-31

## 2023-01-30 RX ORDER — DEXTROSE MONOHYDRATE 25 G/50ML
50 INJECTION, SOLUTION INTRAVENOUS
Status: DISCONTINUED | OUTPATIENT
Start: 2023-01-30 | End: 2023-01-31

## 2023-01-30 RX ORDER — PANTOPRAZOLE SODIUM 40 MG/1
40 TABLET, DELAYED RELEASE ORAL
Status: DISCONTINUED | OUTPATIENT
Start: 2023-01-31 | End: 2023-01-31

## 2023-01-30 RX ORDER — ONDANSETRON 2 MG/ML
4 INJECTION INTRAMUSCULAR; INTRAVENOUS EVERY 6 HOURS PRN
Status: DISCONTINUED | OUTPATIENT
Start: 2023-01-30 | End: 2023-01-31

## 2023-01-30 RX ORDER — HYDROCODONE BITARTRATE AND ACETAMINOPHEN 5; 325 MG/1; MG/1
1 TABLET ORAL EVERY 4 HOURS PRN
Status: DISCONTINUED | OUTPATIENT
Start: 2023-01-30 | End: 2023-01-31

## 2023-01-30 RX ORDER — ENOXAPARIN SODIUM 100 MG/ML
40 INJECTION SUBCUTANEOUS DAILY
Status: DISCONTINUED | OUTPATIENT
Start: 2023-01-30 | End: 2023-01-31

## 2023-01-30 RX ORDER — POLYETHYLENE GLYCOL 3350 17 G/17G
17 POWDER, FOR SOLUTION ORAL DAILY PRN
Status: DISCONTINUED | OUTPATIENT
Start: 2023-01-30 | End: 2023-01-31

## 2023-01-30 RX ORDER — ECHINACEA PURPUREA EXTRACT 125 MG
1 TABLET ORAL
Status: DISCONTINUED | OUTPATIENT
Start: 2023-01-30 | End: 2023-01-31

## 2023-01-30 RX ORDER — PROCHLORPERAZINE EDISYLATE 5 MG/ML
5 INJECTION INTRAMUSCULAR; INTRAVENOUS EVERY 8 HOURS PRN
Status: DISCONTINUED | OUTPATIENT
Start: 2023-01-30 | End: 2023-01-31

## 2023-01-30 RX ORDER — NICOTINE POLACRILEX 4 MG
30 LOZENGE BUCCAL
Status: DISCONTINUED | OUTPATIENT
Start: 2023-01-30 | End: 2023-01-31

## 2023-01-30 NOTE — TELEPHONE ENCOUNTER
Spoke to FABIEN from [employer] [place]. She states they have tried to call patient several times to get authorization for ocrevus to be shipped. She states no one has answered the phone. Advised to call again. Reminding again the medication needs to be shipped to [facility].

## 2023-01-30 NOTE — PROGRESS NOTES
NURSING ADMISSION NOTE      Patient admitted via Wheelchair  Oriented to room. Safety precautions initiated. Bed in low position. Call light in reach. Pt only wants mom communicated with.

## 2023-01-30 NOTE — TELEPHONE ENCOUNTER
Per , pt to be admitted directly to hospital on 01/30/2023 for MS flare. Patient needs a medical bed. Orders:      1. Contacted [facility] at [phone] to confirm IP bed is available. 2. Confirmed patient's PCP is a(n) [facility] provider, so the correct hospitalist is contacted. 3. Paged [facility] Hospitalist at [phone]. Dr. Yung Guidry is current on-call hospitalist.  Mars Crump return call for acceptance of patient. 4. Hospitalist returned page and was informed of direct admission and above orders given. 5. Called [facility] at [phone] to notify of admission and accepting accepting physician. 6. Patient to present to the ER Registration desk in [facility]. Patient to inform staff that Direct Admission as been arranged. Patient should present to [facility] on 01/30/2023 around when they can. 7. Dr. Daron Mazariegos, on-call neurologist contacted with admission information. Patient notified of pending admission. Verbalized understanding of instructions. Spoke to patient's mother regarding being admitted. She states he doesn't want to go now because he wants his ocrevus. Advised the ocrevus is pending and he will need to go. Patient's mother verbalized understanding.

## 2023-01-31 ENCOUNTER — TELEPHONE (OUTPATIENT)
Dept: NEUROLOGY | Facility: CLINIC | Age: 48
End: 2023-01-31

## 2023-01-31 VITALS
TEMPERATURE: 98 F | RESPIRATION RATE: 17 BRPM | WEIGHT: 189 LBS | OXYGEN SATURATION: 96 % | HEART RATE: 105 BPM | DIASTOLIC BLOOD PRESSURE: 61 MMHG | SYSTOLIC BLOOD PRESSURE: 147 MMHG | BODY MASS INDEX: 31 KG/M2

## 2023-01-31 LAB
GLUCOSE BLD-MCNC: 227 MG/DL (ref 70–99)
GLUCOSE BLD-MCNC: 309 MG/DL (ref 70–99)

## 2023-01-31 PROCEDURE — 99239 HOSP IP/OBS DSCHRG MGMT >30: CPT | Performed by: INTERNAL MEDICINE

## 2023-01-31 PROCEDURE — 99232 SBSQ HOSP IP/OBS MODERATE 35: CPT | Performed by: OTHER

## 2023-01-31 NOTE — CM/SW NOTE
SW met with patient to discuss [facility] services. Patient reported that he is current with [address] [place] [address]. SW sent resume of care orders to [facility] via [facility] and updated AVS.     SW will continue to follow for plan of care changes and remain available for any additional DC needs or concerns.      Justin Kwok MSW, LSW  Discharge Planner   [phone]

## 2023-01-31 NOTE — PLAN OF CARE
Problem: Diabetes/Glucose Control  Goal: Glucose maintained within prescribed range  Description: INTERVENTIONS:  - Monitor Blood Glucose as ordered  - Assess for signs and symptoms of hyperglycemia and hypoglycemia  - Administer ordered medications to maintain glucose within target range  - Assess barriers to adequate nutritional intake and initiate nutrition consult as needed  - Instruct patient on self management of diabetes  1/30/2023 2219 by Cayla Leach RN  Outcome: Not Progressing  1/30/2023 2219 by Cayla Leach RN  Outcome: Not Progressing

## 2023-01-31 NOTE — TELEPHONE ENCOUNTER
Called John regarding letter being sent. Advised to call [facility] and number provided. Sent letter in regular mail and sent certified letter. Advised that in order to receive ocrevus patient will need to authorize shipment to the cancer center from [facility].

## 2023-01-31 NOTE — TELEPHONE ENCOUNTER
Pt called to state she contacted [facility] and auth shipment to the [facility]. Pt was told will be shipped on 2/2/2023 and should arrive on 2/3/2023 at [facility]. Pt asking if [facility] will call pt to set up appt or does she need to call. Please advise. Makenzie Fair

## 2023-01-31 NOTE — PLAN OF CARE
Pt received a/o x4. VSS, afebrile. C/o numbness and tingling bilaterally in hands, legs and chest/stomach area. Meds given per MAR. Fall precautions in place. Call light within reach.

## 2023-01-31 NOTE — PLAN OF CARE
Patient is alert and oriented, on RA, standby assist to the bathroom, unsteady gait. Pt still complaining of BUE numbness and tingling. Good appetite. All meds given per [facility], vitals are stable, no fever overnight. No acute events overnight, will continue to monitor.     Problem: Diabetes/Glucose Control  Goal: Glucose maintained within prescribed range  Description: INTERVENTIONS:  - Monitor Blood Glucose as ordered  - Assess for signs and symptoms of hyperglycemia and hypoglycemia  - Administer ordered medications to maintain glucose within target range  - Assess barriers to adequate nutritional intake and initiate nutrition consult as needed  - Instruct patient on self management of diabetes  Outcome: Not Progressing

## 2023-01-31 NOTE — DISCHARGE INSTRUCTIONS
Sometimes managing your health at home requires assistance. The [facility] team has recognized your preference to use [facility]. They can be reached by phone at [phone]. The fax number for your reference is [phone]. . A representative from the home health agency will contact you or your family to schedule your first visit.

## 2023-01-31 NOTE — PROGRESS NOTES
RN discussed discharge details with patient including follow-up for infusion. Patient verbalized understanding. IV removed. No distress noted upon discharge. Patient to [facility] where mother picked patient up.

## 2023-02-01 ENCOUNTER — TELEPHONE (OUTPATIENT)
Dept: NEUROLOGY | Facility: CLINIC | Age: 48
End: 2023-02-01

## 2023-02-01 ENCOUNTER — PATIENT OUTREACH (OUTPATIENT)
Dept: CASE MANAGEMENT | Age: 48
End: 2023-02-01

## 2023-02-01 NOTE — TELEPHONE ENCOUNTER
Pt is calling staing he was released yesterday from the hospital with feelings of numbness in his hands and arms and then feeling going into his chest. Pt also states his right eye sight is really bad and he is getting worried and needs to know what he should do and if he should go back to the hospital? Pt is asking if someone can please give him a call back for directions.

## 2023-02-02 NOTE — TELEPHONE ENCOUNTER
Late Entry:    Provider spoke with patient (call was transferred) and patient's mother and again reiterated disease process of MS.

## 2023-02-03 ENCOUNTER — TELEPHONE (OUTPATIENT)
Dept: HEMATOLOGY/ONCOLOGY | Facility: HOSPITAL | Age: 48
End: 2023-02-03

## 2023-02-03 NOTE — TELEPHONE ENCOUNTER
Pt's mother calling- confirmed with [employer] yesterday that med should be delivered today. Med not yet delivered per pharmacy. Mother upset \" I have been on the phone with [employer] for 2 hours. \" Pharmacy told her it was supposed to be shipped out and delivered today. \"they cannot talk to my son, it needs to be me. \"    Advised mother to call and have contact information changed. Mother hung up.

## 2023-02-03 NOTE — TELEPHONE ENCOUNTER
Spoke with [employer] at [employer]. Med to be delivered Tuesday 2/7 before 1030am. Pt to be scheduled wed at [address] number to be provided on Monday- mother aware RN will call with this information.

## 2023-02-05 ENCOUNTER — TELEPHONE (OUTPATIENT)
Dept: NEUROLOGY | Facility: CLINIC | Age: 48
End: 2023-02-05

## 2023-02-05 RX ORDER — DULOXETIN HYDROCHLORIDE 30 MG/1
30 CAPSULE, DELAYED RELEASE ORAL DAILY
Qty: 30 CAPSULE | Refills: 2 | Status: SHIPPED | OUTPATIENT
Start: 2023-02-05

## 2023-02-05 NOTE — TELEPHONE ENCOUNTER
Patient called c/o numbness in arms and legs, also tingling numbness feeling chest, worried that symptoms not going away. No motor weakness. Baseline gait problem remains the same. No new neuro deficit    Advise to add Duloxetine 30 mg for both neuropathic symptoms and anxiety    On gabapentin 300 mg TID    Call tomorrow with update. Nikhil Hankins and his mom both indicate understanding

## 2023-02-06 ENCOUNTER — TELEPHONE (OUTPATIENT)
Dept: SURGERY | Facility: CLINIC | Age: 48
End: 2023-02-06

## 2023-02-06 ENCOUNTER — TELEPHONE (OUTPATIENT)
Dept: HEMATOLOGY/ONCOLOGY | Facility: HOSPITAL | Age: 48
End: 2023-02-06

## 2023-02-06 NOTE — TELEPHONE ENCOUNTER
Spoke with Ketty Chaparro at [facility]- medication shipment has not yet been picked up from [address] [facility] by [address]. Not able to generate a tracking number until tonight or later tomorrow am.  Will update mother.

## 2023-02-06 NOTE — TELEPHONE ENCOUNTER
Pt has serious concerns about the side effects of the new medication, DULoxetine 30 MG Oral Cap DR Particles. He is afraid to take the medication. Please call to discuss.  [facility] #[phone]

## 2023-02-07 ENCOUNTER — TELEPHONE (OUTPATIENT)
Dept: HEMATOLOGY/ONCOLOGY | Facility: HOSPITAL | Age: 48
End: 2023-02-07

## 2023-02-08 ENCOUNTER — OFFICE VISIT (OUTPATIENT)
Dept: HEMATOLOGY/ONCOLOGY | Facility: HOSPITAL | Age: 48
End: 2023-02-08
Attending: Other
Payer: MEDICAID

## 2023-02-08 VITALS
OXYGEN SATURATION: 97 % | WEIGHT: 187.19 LBS | DIASTOLIC BLOOD PRESSURE: 83 MMHG | RESPIRATION RATE: 18 BRPM | TEMPERATURE: 98 F | SYSTOLIC BLOOD PRESSURE: 122 MMHG | HEART RATE: 87 BPM | BODY MASS INDEX: 30 KG/M2

## 2023-02-08 DIAGNOSIS — G35 MS (MULTIPLE SCLEROSIS) (HCC): Primary | ICD-10-CM

## 2023-02-08 PROCEDURE — 96365 THER/PROPH/DIAG IV INF INIT: CPT

## 2023-02-08 PROCEDURE — 96375 TX/PRO/DX INJ NEW DRUG ADDON: CPT

## 2023-02-08 PROCEDURE — 96366 THER/PROPH/DIAG IV INF ADDON: CPT

## 2023-02-08 RX ORDER — ACETAMINOPHEN 325 MG/1
650 TABLET ORAL ONCE
Status: COMPLETED | OUTPATIENT
Start: 2023-02-08 | End: 2023-02-08

## 2023-02-08 RX ORDER — DIPHENHYDRAMINE HCL 25 MG
50 CAPSULE ORAL ONCE
OUTPATIENT
Start: 2023-02-22

## 2023-02-08 RX ORDER — ACETAMINOPHEN 325 MG/1
650 TABLET ORAL ONCE
OUTPATIENT
Start: 2023-02-22

## 2023-02-08 RX ORDER — DIPHENHYDRAMINE HCL 25 MG
50 CAPSULE ORAL ONCE
Status: COMPLETED | OUTPATIENT
Start: 2023-02-08 | End: 2023-02-08

## 2023-02-08 RX ORDER — METHYLPREDNISOLONE SODIUM SUCCINATE 125 MG/2ML
100 INJECTION, POWDER, LYOPHILIZED, FOR SOLUTION INTRAMUSCULAR; INTRAVENOUS ONCE
Status: COMPLETED | OUTPATIENT
Start: 2023-02-08 | End: 2023-02-08

## 2023-02-08 RX ORDER — METHYLPREDNISOLONE SODIUM SUCCINATE 125 MG/2ML
100 INJECTION, POWDER, LYOPHILIZED, FOR SOLUTION INTRAMUSCULAR; INTRAVENOUS ONCE
OUTPATIENT
Start: 2023-02-22

## 2023-02-08 RX ADMIN — ACETAMINOPHEN 650 MG: 325 TABLET ORAL at 08:25:00

## 2023-02-08 RX ADMIN — DIPHENHYDRAMINE HCL 50 MG: 25 MG CAPSULE ORAL at 08:25:00

## 2023-02-08 RX ADMIN — METHYLPREDNISOLONE SODIUM SUCCINATE 100 MG: 125 INJECTION, POWDER, LYOPHILIZED, FOR SOLUTION INTRAMUSCULAR; INTRAVENOUS at 08:27:00

## 2023-02-08 NOTE — PROGRESS NOTES
Education Record    Learner:  Patient    Disease / Diagnosis: multiple sclerosis    Barriers / Limitations:  None   Comments:    Method:  Discussion   Comments:    General Topics:  Plan of care reviewed   Comments:    Outcome:  Shows understanding   Comments:    Pt here for first Ocrevus infusion. Pt premedicated per orders, tolerated ocrevus infusion well. Discharged in stable condition via wheelchair.

## 2023-02-12 ENCOUNTER — TELEPHONE (OUTPATIENT)
Dept: NEUROLOGY | Facility: CLINIC | Age: 48
End: 2023-02-12

## 2023-02-13 NOTE — TELEPHONE ENCOUNTER
I was paged via answering service.  I called this patient back at the number provided on 2 separatetimes but there was no answer and I left a VM to call back if needed

## 2023-02-13 NOTE — TELEPHONE ENCOUNTER
I was called back by Mr Ned Arroyo. He stated that his hands and arms were still numb after his ocrevus infusion. I explained that his MS lesions can cause these symptoms, He was unable to tell me if his symptoms were worse or just persistent. His mother took the phone and was very upset and angry that we were not doing anything to help her son. I explained that the treatments for MS were to help prevent new lesions and that his Cymbalta may take time to start being effective. She was very angry and insisting that I was doing nothing for her son. I explained that he could go to the ED for evaluation but she refused to listen and was accusatory and belligerent on the phone. She stated that we needed to do something to stop her sons numbness and MS symptoms I explained that he has been seen by neurology several times and that we are working on managing his symptoms.  She stated that she would not go back to the ED and that we were doing nothing for her son,  but would call the clinic tomorrow again to ask for advise

## 2023-02-13 NOTE — TELEPHONE ENCOUNTER
Dr. Carly Amin first available appt is April 5th either 11:00a or 1:00p Please advise if the patient wants either appt.

## 2023-02-20 NOTE — TELEPHONE ENCOUNTER
Spoke to patients mother. She stated she does not want to see Dr. Bradley Gibson. She states he was rude and told patient there was no reason for patient to be in the ER. Per [facility] review This was regarding the call on 2/12/23with Dr. Chip Stevenson where patient states he was no different then before. Patient's mother Maris Gonzalez advised that since she has been unhappy with all care by this office we would suggest patient transfer care to Dr. Bradley Gibson or [facility]. Mother states she does not want to go anywhere else. She states she has only had a problem with the Male provider who was rude. Again advised that the provider was informing them there was nothing more to do at this time since he is receiving Ocrevus. Patient's mother was very verbal about the patients dad, the patients health, her own health. She states she can not drive to [place] or to [facility]. She states she has not had any problem with Dr. Marcel Oneal and would like to continue to see her. Maris Gonzalez also asked about patients diagnosis. She states no one has spoken to her regarding what is going on and what the diagnosis means. She was educated regarding the diagnosis and the treatment of Ocrevus. She was advised the patient should see a therapist to review diagnosis and prognosis    Mother requested an appointment with Dr. Marcel Oneal.  Schedule for next available in [place]    Future Appointments   Date Time Provider Tony Reid   2/22/2023  8:00 AM [address] [place] [address]   5/24/2023  9:00 AM [facility]

## 2023-02-21 ENCOUNTER — TELEPHONE (OUTPATIENT)
Dept: SURGERY | Facility: CLINIC | Age: 48
End: 2023-02-21

## 2023-02-21 NOTE — TELEPHONE ENCOUNTER
Ivis Chandra from ocrevus is calling to find out if pt needs assistance with help finding new infusion treatment center states [facility] does not take pts insurance if pt needs help with with treatment center Ivis Chandra can help contact at [phone] ex [phone]

## 2023-02-21 NOTE — TELEPHONE ENCOUNTER
Patient is not using IVX and obtaining infusion at [address] [place] [address]. No need for assistance at this time.

## 2023-02-22 ENCOUNTER — OFFICE VISIT (OUTPATIENT)
Dept: HEMATOLOGY/ONCOLOGY | Facility: HOSPITAL | Age: 48
End: 2023-02-22
Attending: Other
Payer: MEDICAID

## 2023-02-22 ENCOUNTER — TELEPHONE (OUTPATIENT)
Dept: HEMATOLOGY/ONCOLOGY | Facility: HOSPITAL | Age: 48
End: 2023-02-22

## 2023-02-22 VITALS
OXYGEN SATURATION: 97 % | HEART RATE: 94 BPM | SYSTOLIC BLOOD PRESSURE: 109 MMHG | RESPIRATION RATE: 18 BRPM | DIASTOLIC BLOOD PRESSURE: 78 MMHG | TEMPERATURE: 99 F

## 2023-02-22 DIAGNOSIS — G35 MS (MULTIPLE SCLEROSIS) (HCC): Primary | ICD-10-CM

## 2023-02-22 PROCEDURE — 96366 THER/PROPH/DIAG IV INF ADDON: CPT

## 2023-02-22 PROCEDURE — 96375 TX/PRO/DX INJ NEW DRUG ADDON: CPT

## 2023-02-22 PROCEDURE — 96365 THER/PROPH/DIAG IV INF INIT: CPT

## 2023-02-22 RX ORDER — DIPHENHYDRAMINE HCL 25 MG
50 CAPSULE ORAL ONCE
Status: CANCELLED | OUTPATIENT
Start: 2023-02-22

## 2023-02-22 RX ORDER — DIPHENHYDRAMINE HCL 25 MG
50 CAPSULE ORAL ONCE
Status: COMPLETED | OUTPATIENT
Start: 2023-02-22 | End: 2023-02-22

## 2023-02-22 RX ORDER — METHYLPREDNISOLONE SODIUM SUCCINATE 125 MG/2ML
100 INJECTION, POWDER, LYOPHILIZED, FOR SOLUTION INTRAMUSCULAR; INTRAVENOUS ONCE
Status: CANCELLED | OUTPATIENT
Start: 2023-02-22

## 2023-02-22 RX ORDER — ACETAMINOPHEN 325 MG/1
650 TABLET ORAL ONCE
Status: COMPLETED | OUTPATIENT
Start: 2023-02-22 | End: 2023-02-22

## 2023-02-22 RX ORDER — ACETAMINOPHEN 325 MG/1
650 TABLET ORAL ONCE
Status: CANCELLED | OUTPATIENT
Start: 2023-02-22

## 2023-02-22 RX ORDER — METHYLPREDNISOLONE SODIUM SUCCINATE 125 MG/2ML
100 INJECTION, POWDER, LYOPHILIZED, FOR SOLUTION INTRAMUSCULAR; INTRAVENOUS ONCE
Status: COMPLETED | OUTPATIENT
Start: 2023-02-22 | End: 2023-02-22

## 2023-02-22 RX ADMIN — METHYLPREDNISOLONE SODIUM SUCCINATE 100 MG: 125 INJECTION, POWDER, LYOPHILIZED, FOR SOLUTION INTRAMUSCULAR; INTRAVENOUS at 08:33:00

## 2023-02-22 RX ADMIN — ACETAMINOPHEN 650 MG: 325 TABLET ORAL at 08:32:00

## 2023-02-22 RX ADMIN — DIPHENHYDRAMINE HCL 50 MG: 25 MG CAPSULE ORAL at 08:33:00

## 2023-02-22 NOTE — PROGRESS NOTES
Education Record    Learner:  Patient    Disease / Diagnosis: MS    Barriers / Limitations:  None    Method:  Brief focused, printed material and  reinforcement    General Topics:  Plan of care reviewed    Outcome:  Shows understanding    Here for 2nd ocrevus infusion. First one went well, not feeling much different. Pt to f/u with Neuro before next 6mo infusion. Tolerated infusion well, observed for one hour after infusion.

## 2023-02-22 NOTE — TELEPHONE ENCOUNTER
The patient's mother called to say Marilee [address] just left the infusion center. \"They left a needle in his arm. I want someone to come down stairs and take it out. \"  I spoke with Marilee [address] and told him he needs to come back into the [facility] so the nurse can remove the IV access. He verbalized understanding. Hakeem Harleyg kept yelling the nurse needs to come down stairs to them. I called and updated Galo Bhardwaj RN [employer]. She said she will send a nurse downstairs now.

## 2023-02-24 ENCOUNTER — TELEPHONE (OUTPATIENT)
Dept: NEUROLOGY | Facility: CLINIC | Age: 48
End: 2023-02-24

## 2023-02-24 RX ORDER — DIPHENHYDRAMINE HYDROCHLORIDE 50 MG/ML
50 INJECTION INTRAMUSCULAR; INTRAVENOUS ONCE
OUTPATIENT
Start: 2023-08-06

## 2023-02-24 RX ORDER — METHYLPREDNISOLONE SODIUM SUCCINATE 125 MG/2ML
100 INJECTION, POWDER, LYOPHILIZED, FOR SOLUTION INTRAMUSCULAR; INTRAVENOUS ONCE
OUTPATIENT
Start: 2023-08-06

## 2023-02-24 RX ORDER — OCRELIZUMAB 300 MG/10ML
600 INJECTION INTRAVENOUS
Qty: 20 ML | Refills: 0 | Status: SHIPPED | OUTPATIENT
Start: 2023-08-08

## 2023-02-24 RX ORDER — ACETAMINOPHEN 325 MG/1
650 TABLET ORAL ONCE
OUTPATIENT
Start: 2023-08-06

## 2023-02-24 NOTE — TELEPHONE ENCOUNTER
Patient will be due for 6 month Ocrevus on 8/6/23. [facility].

## 2023-04-20 ENCOUNTER — TELEPHONE (OUTPATIENT)
Dept: SURGERY | Facility: CLINIC | Age: 48
End: 2023-04-20

## 2023-04-20 NOTE — TELEPHONE ENCOUNTER
Pt's mother called because Pt is demanding her buy him marijuana for his pain. She wants to know what what provider thinks about it. She said is very strong and is being very forceful about the request. Igor RICKETTS# [phone]. Endorsed to RN for Provider. Sarecycline Counseling: Patient advised regarding possible photosensitivity and discoloration of the teeth, skin, lips, tongue and gums.  Patient instructed to avoid sunlight, if possible.  When exposed to sunlight, patients should wear protective clothing, sunglasses, and sunscreen.  The patient was instructed to call the office immediately if the following severe adverse effects occur:  hearing changes, easy bruising/bleeding, severe headache, or vision changes.  The patient verbalized understanding of the proper use and possible adverse effects of sarecycline.  All of the patient's questions and concerns were addressed.

## 2023-04-20 NOTE — TELEPHONE ENCOUNTER
Spoke to patients mother (ok per Hipaa). She states she has concerns with her son    -he is strong then he was las visit  -he is fighting with her in the middle of the night  -she has to run everywhere for him-(store, pharmacy, etc)  -her car needs maintence and cost 300$  -he wants to start marijuana-states he googled it and it is appropriate for treatment of MS  -It cost 200$ and wants OK from this office for approval to buy-NOT GIVEN  -patient is stronger and mother does not want to give him ok but wants the OK from the provider. Advised mother that this office will not give OK and tried to refer her to the [address] [place] [address] without success. Advised mother to call with any other questions involving MS and his current treatment.

## 2023-04-26 DIAGNOSIS — R20.0 NUMBNESS AND TINGLING OF BOTH UPPER EXTREMITIES: Primary | ICD-10-CM

## 2023-04-26 DIAGNOSIS — R20.2 NUMBNESS AND TINGLING OF BOTH UPPER EXTREMITIES: Primary | ICD-10-CM

## 2023-04-28 RX ORDER — DULOXETIN HYDROCHLORIDE 30 MG/1
CAPSULE, DELAYED RELEASE ORAL
Qty: 30 CAPSULE | Refills: 2 | Status: SHIPPED | OUTPATIENT
Start: 2023-04-28

## 2023-05-24 ENCOUNTER — OFFICE VISIT (OUTPATIENT)
Dept: NEUROLOGY | Facility: CLINIC | Age: 48
End: 2023-05-24
Payer: MEDICAID

## 2023-05-24 ENCOUNTER — TELEPHONE (OUTPATIENT)
Dept: NEUROLOGY | Facility: CLINIC | Age: 48
End: 2023-05-24

## 2023-05-24 VITALS
WEIGHT: 187 LBS | RESPIRATION RATE: 16 BRPM | BODY MASS INDEX: 30 KG/M2 | SYSTOLIC BLOOD PRESSURE: 124 MMHG | HEART RATE: 86 BPM | DIASTOLIC BLOOD PRESSURE: 86 MMHG

## 2023-05-24 DIAGNOSIS — R20.0 NUMBNESS AND TINGLING OF BOTH UPPER EXTREMITIES: ICD-10-CM

## 2023-05-24 DIAGNOSIS — G35 MS (MULTIPLE SCLEROSIS) (HCC): Primary | ICD-10-CM

## 2023-05-24 DIAGNOSIS — R20.2 NUMBNESS AND TINGLING OF BOTH UPPER EXTREMITIES: ICD-10-CM

## 2023-05-24 DIAGNOSIS — R26.9 GAIT ABNORMALITY: ICD-10-CM

## 2023-05-24 PROCEDURE — 3074F SYST BP LT 130 MM HG: CPT | Performed by: OTHER

## 2023-05-24 PROCEDURE — 3079F DIAST BP 80-89 MM HG: CPT | Performed by: OTHER

## 2023-05-24 PROCEDURE — 99214 OFFICE O/P EST MOD 30 MIN: CPT | Performed by: OTHER

## 2023-05-24 RX ORDER — GABAPENTIN 300 MG/1
300 CAPSULE ORAL 3 TIMES DAILY
Qty: 90 CAPSULE | Refills: 2 | Status: SHIPPED | OUTPATIENT
Start: 2023-05-24

## 2023-05-24 RX ORDER — TRAMADOL HYDROCHLORIDE 50 MG/1
50 TABLET ORAL EVERY 12 HOURS PRN
Qty: 60 TABLET | Refills: 5 | Status: SHIPPED | OUTPATIENT
Start: 2023-05-24

## 2023-05-24 RX ORDER — TRAMADOL HYDROCHLORIDE 50 MG/1
50 TABLET ORAL EVERY 12 HOURS PRN
COMMUNITY
Start: 2023-04-26 | End: 2023-05-24

## 2023-05-24 RX ORDER — DULOXETIN HYDROCHLORIDE 30 MG/1
30 CAPSULE, DELAYED RELEASE ORAL DAILY
Qty: 60 CAPSULE | Refills: 5 | Status: SHIPPED | OUTPATIENT
Start: 2023-05-24 | End: 2023-05-25

## 2023-05-24 NOTE — TELEPHONE ENCOUNTER
Erlin Sweeney has another medication question following today's appt. She states Provider wanted her son to take two DULoxetine 30 MG Oral Cap DR Particles per day. However, the RX still states one per day. Please advise, best call back number is [phone]. Endorsed to [employer].

## 2023-05-24 NOTE — PROGRESS NOTES
Patient states increase in pain in his fingertips, feet and legs. Patient states walking is difficult. Patient states he feels wobbly. Patient fell this morning.

## 2023-05-24 NOTE — TELEPHONE ENCOUNTER
Pt's mother, Hakeem Hernandez, is calling to state that provider was supposed to put in for Vicodin instead of the tramadol and says the Vicodin works better. Hakeem Hernandez is requesting to speak with Noemí Polk RN.

## 2023-05-25 RX ORDER — DULOXETIN HYDROCHLORIDE 30 MG/1
30 CAPSULE, DELAYED RELEASE ORAL 2 TIMES DAILY
Qty: 60 CAPSULE | Refills: 5 | Status: SHIPPED | OUTPATIENT
Start: 2023-05-25

## 2023-06-27 ENCOUNTER — TELEPHONE (OUTPATIENT)
Dept: NEUROLOGY | Facility: CLINIC | Age: 48
End: 2023-06-27

## 2023-06-27 DIAGNOSIS — G35 MS (MULTIPLE SCLEROSIS) (HCC): Primary | ICD-10-CM

## 2023-07-03 DIAGNOSIS — G35 MS (MULTIPLE SCLEROSIS) (HCC): Primary | ICD-10-CM

## 2023-07-03 RX ORDER — OCRELIZUMAB 300 MG/10ML
600 INJECTION INTRAVENOUS
Qty: 20 ML | Refills: 0 | Status: SHIPPED | OUTPATIENT
Start: 2023-08-08

## 2023-07-17 ENCOUNTER — TELEPHONE (OUTPATIENT)
Dept: NEUROLOGY | Facility: CLINIC | Age: 48
End: 2023-07-17

## 2023-07-17 NOTE — TELEPHONE ENCOUNTER
Received fax from [employer] stating PA needed for Ocrevus re-authorization. Previous prior authorization  on 2023. Spoke with [employer] representative Remedios Ron and PA for Hedy Ferrer completed over phone (). Hedy Ferrer approved, Case# , Approval # [id number] and is approved 2023-2024. PA not needed for administration at / infusion center per Anila Floyd form our PA Dept. Approval letter will be faxed to [phone] within the next 24 hours.

## 2023-08-02 ENCOUNTER — TELEPHONE (OUTPATIENT)
Dept: NEUROLOGY | Facility: CLINIC | Age: 48
End: 2023-08-02

## 2023-08-02 NOTE — TELEPHONE ENCOUNTER
Spoke to [facility] regarding authorization for [facility] to shipped ocrevus to infusion center. Patient is scheduled to receive infusion on 8/7/23. Per epic review appointment was canceled. Patient mother notified. She asked if medication would be ok to received on Friday. Advised that she will have to call the cancer center as we do not schedule appointment. Mother Christina Garibay) hung up on this RN.

## 2023-08-02 NOTE — TELEPHONE ENCOUNTER
Spoke with pt mother Vilma Vincent N#[id number]; Smith Aggarwal is requesting call back from Rn regarding ocrevus states pt will receive on 8/4/2023 and appointment is sched for 8/7/2023 please advise.

## 2023-08-07 ENCOUNTER — OFFICE VISIT (OUTPATIENT)
Dept: HEMATOLOGY/ONCOLOGY | Facility: HOSPITAL | Age: 48
End: 2023-08-07
Attending: Other
Payer: MEDICAID

## 2023-08-07 VITALS
WEIGHT: 195 LBS | RESPIRATION RATE: 18 BRPM | HEIGHT: 65.98 IN | HEART RATE: 98 BPM | TEMPERATURE: 98 F | DIASTOLIC BLOOD PRESSURE: 70 MMHG | BODY MASS INDEX: 31.34 KG/M2 | SYSTOLIC BLOOD PRESSURE: 106 MMHG | OXYGEN SATURATION: 96 %

## 2023-08-07 DIAGNOSIS — G35 MS (MULTIPLE SCLEROSIS) (HCC): Primary | ICD-10-CM

## 2023-08-07 PROCEDURE — 96366 THER/PROPH/DIAG IV INF ADDON: CPT

## 2023-08-07 PROCEDURE — 96375 TX/PRO/DX INJ NEW DRUG ADDON: CPT

## 2023-08-07 PROCEDURE — 96365 THER/PROPH/DIAG IV INF INIT: CPT

## 2023-08-07 RX ORDER — ACETAMINOPHEN 325 MG/1
650 TABLET ORAL ONCE
Status: COMPLETED | OUTPATIENT
Start: 2023-08-07 | End: 2023-08-07

## 2023-08-07 RX ORDER — DIPHENHYDRAMINE HYDROCHLORIDE 50 MG/ML
50 INJECTION INTRAMUSCULAR; INTRAVENOUS ONCE
Status: CANCELLED | OUTPATIENT
Start: 2023-08-07

## 2023-08-07 RX ORDER — ACETAMINOPHEN 325 MG/1
650 TABLET ORAL ONCE
Status: CANCELLED | OUTPATIENT
Start: 2023-08-07

## 2023-08-07 RX ORDER — METHYLPREDNISOLONE SODIUM SUCCINATE 125 MG/2ML
100 INJECTION, POWDER, LYOPHILIZED, FOR SOLUTION INTRAMUSCULAR; INTRAVENOUS ONCE
Status: COMPLETED | OUTPATIENT
Start: 2023-08-07 | End: 2023-08-07

## 2023-08-07 RX ORDER — DIPHENHYDRAMINE HYDROCHLORIDE 50 MG/ML
50 INJECTION INTRAMUSCULAR; INTRAVENOUS ONCE
Status: COMPLETED | OUTPATIENT
Start: 2023-08-07 | End: 2023-08-07

## 2023-08-07 RX ORDER — METHYLPREDNISOLONE SODIUM SUCCINATE 125 MG/2ML
100 INJECTION, POWDER, LYOPHILIZED, FOR SOLUTION INTRAMUSCULAR; INTRAVENOUS ONCE
Status: CANCELLED | OUTPATIENT
Start: 2023-08-07

## 2023-08-07 RX ADMIN — DIPHENHYDRAMINE HYDROCHLORIDE 50 MG: 50 INJECTION INTRAMUSCULAR; INTRAVENOUS at 08:32:00

## 2023-08-07 RX ADMIN — METHYLPREDNISOLONE SODIUM SUCCINATE 100 MG: 125 INJECTION, POWDER, LYOPHILIZED, FOR SOLUTION INTRAMUSCULAR; INTRAVENOUS at 08:33:00

## 2023-08-07 RX ADMIN — ACETAMINOPHEN 650 MG: 325 TABLET ORAL at 08:32:00

## 2023-08-07 NOTE — PROGRESS NOTES
Education Record    Learner:  Patient    Disease / Diagnosis: MS, here for Ocrevus    Barriers / Limitations:  None   Comments:    Method:  Brief focused and Discussion   Comments:    General Topics:  Medication, Side effects and symptom management, and Plan of care reviewed   Comments:    Outcome:  Shows understanding   Comments:    Premedications given. 30-60 minute wait post premedications prior to starting infusion completed per MD order. Ocrevus infused per MD order without incident. Monitored for 1 hour post infusion. Discharged home in stable condition, no new complaints.

## 2023-08-08 ENCOUNTER — TELEPHONE (OUTPATIENT)
Dept: NEUROLOGY | Facility: CLINIC | Age: 48
End: 2023-08-08

## 2023-08-08 NOTE — TELEPHONE ENCOUNTER
Spoke to Dr. Benji Mobley and he states patient was very lethargic and not feeling well. Labs drawn and WBC was 30.4.  They did consult infections disease but asking if side effect of Ocrevus

## 2023-08-08 NOTE — TELEPHONE ENCOUNTER
from [facility] emergency room is requesting to speak with  about a Rx concern states pt is currently in emergency room please advise X#[phone]

## 2023-08-08 NOTE — TELEPHONE ENCOUNTER
I believe it usually causes low white blood cell counts, but I cannot completely rule this out. Overall I don't have too much experience with this drug. Would do complete infectious disease work up, trend WBC, and notify Dr. Mariah Sanchez when she returns.

## 2023-08-08 NOTE — TELEPHONE ENCOUNTER
Relayed in formation to Dr. Benji Mobley from Dr. Rabia Perry. Patient will be admitted for observation and seen by Infectious disease.

## 2023-11-01 ENCOUNTER — OFFICE VISIT (OUTPATIENT)
Dept: NEUROLOGY | Facility: CLINIC | Age: 48
End: 2023-11-01
Payer: MEDICAID

## 2023-11-01 ENCOUNTER — HOSPITAL ENCOUNTER (EMERGENCY)
Facility: HOSPITAL | Age: 48
Discharge: HOME | End: 2023-11-01
Attending: STUDENT IN AN ORGANIZED HEALTH CARE EDUCATION/TRAINING PROGRAM
Payer: COMMERCIAL

## 2023-11-01 ENCOUNTER — APPOINTMENT (OUTPATIENT)
Dept: GENERAL RADIOLOGY | Facility: HOSPITAL | Age: 48
End: 2023-11-01
Attending: STUDENT IN AN ORGANIZED HEALTH CARE EDUCATION/TRAINING PROGRAM
Payer: COMMERCIAL

## 2023-11-01 VITALS
TEMPERATURE: 98 F | RESPIRATION RATE: 17 BRPM | BODY MASS INDEX: 31 KG/M2 | DIASTOLIC BLOOD PRESSURE: 71 MMHG | OXYGEN SATURATION: 97 % | HEIGHT: 66 IN | HEART RATE: 57 BPM | SYSTOLIC BLOOD PRESSURE: 115 MMHG

## 2023-11-01 VITALS
SYSTOLIC BLOOD PRESSURE: 120 MMHG | OXYGEN SATURATION: 96 % | DIASTOLIC BLOOD PRESSURE: 64 MMHG | HEART RATE: 78 BPM | RESPIRATION RATE: 16 BRPM

## 2023-11-01 DIAGNOSIS — R20.0 NUMBNESS AND TINGLING OF BOTH UPPER EXTREMITIES: ICD-10-CM

## 2023-11-01 DIAGNOSIS — H54.7 POOR VISION: ICD-10-CM

## 2023-11-01 DIAGNOSIS — R26.9 GAIT ABNORMALITY: ICD-10-CM

## 2023-11-01 DIAGNOSIS — G35 MS (MULTIPLE SCLEROSIS) (HCC): ICD-10-CM

## 2023-11-01 DIAGNOSIS — Z86.69 HISTORY OF OPTIC NEURITIS: ICD-10-CM

## 2023-11-01 DIAGNOSIS — G35 MS (MULTIPLE SCLEROSIS) (HCC): Primary | ICD-10-CM

## 2023-11-01 DIAGNOSIS — R20.2 NUMBNESS AND TINGLING OF BOTH UPPER EXTREMITIES: ICD-10-CM

## 2023-11-01 DIAGNOSIS — G35 MULTIPLE SCLEROSIS (HCC): Primary | ICD-10-CM

## 2023-11-01 LAB
ALBUMIN SERPL-MCNC: 4.1 G/DL (ref 3.4–5)
ALBUMIN/GLOB SERPL: 1.2 {RATIO} (ref 1–2)
ALP LIVER SERPL-CCNC: 99 U/L
ALT SERPL-CCNC: 46 U/L
ANION GAP SERPL CALC-SCNC: 8 MMOL/L (ref 0–18)
AST SERPL-CCNC: 20 U/L (ref 15–37)
BASOPHILS # BLD AUTO: 0.07 X10(3) UL (ref 0–0.2)
BASOPHILS NFR BLD AUTO: 0.8 %
BILIRUB SERPL-MCNC: 0.5 MG/DL (ref 0.1–2)
BUN BLD-MCNC: 11 MG/DL (ref 9–23)
CALCIUM BLD-MCNC: 9.7 MG/DL (ref 8.5–10.1)
CHLORIDE SERPL-SCNC: 106 MMOL/L (ref 98–112)
CO2 SERPL-SCNC: 25 MMOL/L (ref 21–32)
CREAT BLD-MCNC: 0.8 MG/DL
EGFRCR SERPLBLD CKD-EPI 2021: 111 ML/MIN/1.73M2 (ref 60–?)
EOSINOPHIL # BLD AUTO: 0.24 X10(3) UL (ref 0–0.7)
EOSINOPHIL NFR BLD AUTO: 2.9 %
ERYTHROCYTE [DISTWIDTH] IN BLOOD BY AUTOMATED COUNT: 13.7 %
FLUAV + FLUBV RNA SPEC NAA+PROBE: NEGATIVE
FLUAV + FLUBV RNA SPEC NAA+PROBE: NEGATIVE
GLOBULIN PLAS-MCNC: 3.4 G/DL (ref 2.8–4.4)
GLUCOSE BLD-MCNC: 123 MG/DL (ref 70–99)
HCT VFR BLD AUTO: 45.8 %
HGB BLD-MCNC: 16.2 G/DL
IMM GRANULOCYTES # BLD AUTO: 0.02 X10(3) UL (ref 0–1)
IMM GRANULOCYTES NFR BLD: 0.2 %
LYMPHOCYTES # BLD AUTO: 1.86 X10(3) UL (ref 1–4)
LYMPHOCYTES NFR BLD AUTO: 22.1 %
MCH RBC QN AUTO: 29.9 PG (ref 26–34)
MCHC RBC AUTO-ENTMCNC: 35.4 G/DL (ref 31–37)
MCV RBC AUTO: 84.7 FL
MONOCYTES # BLD AUTO: 0.85 X10(3) UL (ref 0.1–1)
MONOCYTES NFR BLD AUTO: 10.1 %
NEUTROPHILS # BLD AUTO: 5.36 X10 (3) UL (ref 1.5–7.7)
NEUTROPHILS # BLD AUTO: 5.36 X10(3) UL (ref 1.5–7.7)
NEUTROPHILS NFR BLD AUTO: 63.9 %
OSMOLALITY SERPL CALC.SUM OF ELEC: 289 MOSM/KG (ref 275–295)
PLATELET # BLD AUTO: 351 10(3)UL (ref 150–450)
POTASSIUM SERPL-SCNC: 3 MMOL/L (ref 3.5–5.1)
PROT SERPL-MCNC: 7.5 G/DL (ref 6.4–8.2)
RBC # BLD AUTO: 5.41 X10(6)UL
RSV RNA SPEC NAA+PROBE: NEGATIVE
SARS-COV-2 RNA RESP QL NAA+PROBE: NOT DETECTED
SODIUM SERPL-SCNC: 139 MMOL/L (ref 136–145)
WBC # BLD AUTO: 8.4 X10(3) UL (ref 4–11)

## 2023-11-01 PROCEDURE — 36415 COLL VENOUS BLD VENIPUNCTURE: CPT

## 2023-11-01 PROCEDURE — 80053 COMPREHEN METABOLIC PANEL: CPT | Performed by: STUDENT IN AN ORGANIZED HEALTH CARE EDUCATION/TRAINING PROGRAM

## 2023-11-01 PROCEDURE — 97116 GAIT TRAINING THERAPY: CPT

## 2023-11-01 PROCEDURE — 99215 OFFICE O/P EST HI 40 MIN: CPT | Performed by: OTHER

## 2023-11-01 PROCEDURE — 97165 OT EVAL LOW COMPLEX 30 MIN: CPT

## 2023-11-01 PROCEDURE — 97535 SELF CARE MNGMENT TRAINING: CPT

## 2023-11-01 PROCEDURE — 0241U SARS-COV-2/FLU A AND B/RSV BY PCR (GENEXPERT): CPT | Performed by: STUDENT IN AN ORGANIZED HEALTH CARE EDUCATION/TRAINING PROGRAM

## 2023-11-01 PROCEDURE — 99285 EMERGENCY DEPT VISIT HI MDM: CPT

## 2023-11-01 PROCEDURE — 3074F SYST BP LT 130 MM HG: CPT | Performed by: OTHER

## 2023-11-01 PROCEDURE — 97161 PT EVAL LOW COMPLEX 20 MIN: CPT

## 2023-11-01 PROCEDURE — 70150 X-RAY EXAM OF FACIAL BONES: CPT | Performed by: STUDENT IN AN ORGANIZED HEALTH CARE EDUCATION/TRAINING PROGRAM

## 2023-11-01 PROCEDURE — 85025 COMPLETE CBC W/AUTO DIFF WBC: CPT | Performed by: STUDENT IN AN ORGANIZED HEALTH CARE EDUCATION/TRAINING PROGRAM

## 2023-11-01 PROCEDURE — 3078F DIAST BP <80 MM HG: CPT | Performed by: OTHER

## 2023-11-01 RX ORDER — POTASSIUM CHLORIDE 20 MEQ/1
40 TABLET, EXTENDED RELEASE ORAL ONCE
Status: COMPLETED | OUTPATIENT
Start: 2023-11-01 | End: 2023-11-01

## 2023-11-01 RX ORDER — GABAPENTIN 300 MG/1
300 CAPSULE ORAL 3 TIMES DAILY
Qty: 90 CAPSULE | Refills: 2 | Status: SHIPPED | OUTPATIENT
Start: 2023-11-01

## 2023-11-01 RX ORDER — DULOXETIN HYDROCHLORIDE 60 MG/1
60 CAPSULE, DELAYED RELEASE ORAL 2 TIMES DAILY
Qty: 60 CAPSULE | Refills: 5 | Status: SHIPPED | OUTPATIENT
Start: 2023-11-01

## 2023-11-01 NOTE — PROGRESS NOTES
Patient states increase numbness in hands, feet and legs. Patient states difficulty with walking. Patient states decrease in vision. Patient states \"blind\". Patient states increase in fatigue.

## 2023-11-01 NOTE — PHYSICAL THERAPY NOTE
PHYSICAL THERAPY EVALUATION - INPATIENT     Room Number: A9/A9  Evaluation Date: 11/1/2023  Type of Evaluation: Initial  Physician Order: PT Eval and Treat    Presenting Problem: fall at OP MD office  Co-Morbidities : MS  Reason for Therapy: Mobility Dysfunction and Discharge Planning    History related to current admission: Patient is a 57year old male admitted on 11/1/2023 from MD office after a fall. Impression  CONCLUSION:    1. No acute displaced fracture appreciated. Continued clinical correlation recommended. LOCATION:  [facility]         Dictated by (CST): Raul Farias MD on 11/01/2023 at 11:39 AM       Finalized by (CST): Raul Farias MD on 11/01/2023 at 11:41 AM    ASSESSMENT   In this PT evaluation, the patient presents with the following impairments 1) Slow gait speed with variable peggy - recommend use of his walker instead of utilizing the walls for support for falls prevention. Functional outcome measures completed include AMPAC. The AM-PAC '6-Clicks' Inpatient Basic Mobility Short Form was completed and this patient is demonstrating a Approx Degree of Impairment: 11.2%  degree of impairment in mobility. Research supports that patients with this level of impairment may benefit from OPPT. DISCHARGE RECOMMENDATIONS  PT Discharge Recommendations: Outpatient PT    PLAN         Patient has met all skilled IPPT goals at this time. Patient will be discharged from Physical Therapy services. Please re-order if a new functional limitation presents during this admission.              HOME SITUATION  Type of Home: Other (Comment) (EXTENDED HOTEL STAY)   Home Layout: One level                Lives With: Alone (DOG)  Drives: No  Patient Owned Equipment: Rolling walker  Patient Regularly Uses: Glasses    Prior Level of [place]: Pt reports ambulatory at baseline and holds onto the walls for support- mainly walks outside and back inside to have a cigarette, reports 30+ falls per day - when he does fall he lays with his dog for a bit and is usually able to get himself back up afterwards, has a standard  walker but does not use because it makes him feel claustrophobic. Pt reports he is blind. Completes ADLs at [facility] without assist. He does not live in a house with his mother anymore. SUBJECTIVE  \"I can't see but I can hear really well\"       OBJECTIVE  Precautions: None  Fall Risk: Standard fall risk    WEIGHT BEARING RESTRICTION  Weight Bearing Restriction: None                PAIN ASSESSMENT   Denied pain throughout session however reported nausea after stand to sit transfer back to bed, reported improved after returning to supine           COGNITION  Overall Cognitive Status:  WFL - within functional limits    RANGE OF MOTION AND STRENGTH ASSESSMENT  Upper extremity ROM and strength are within functional limits     Lower extremity ROM is within functional limits     Lower extremity strength is within functional limits       BALANCE  Static Sitting: Good  Dynamic Sitting: Good  Static Standing: Fair -  Dynamic Standing: Fair -    ADDITIONAL TESTS                                    ACTIVITY TOLERANCE                         O2 WALK       NEUROLOGICAL FINDINGS                        AM-PAC '6-Clicks' INPATIENT SHORT FORM - BASIC MOBILITY  How much difficulty does the patient currently have. .. Patient Difficulty: Turning over in bed (including adjusting bedclothes, sheets and blankets)?: None   Patient Difficulty: Sitting down on and standing up from a chair with arms (e.g., wheelchair, bedside commode, etc.): None   Patient Difficulty: Moving from lying on back to sitting on the side of the bed?: None   How much help from another person does the patient currently need. ..    Help from Another: Moving to and from a bed to a chair (including a wheelchair)?: None   Help from Another: Need to walk in hospital room?: None   Help from Another: Climbing 3-5 steps with a railing?: A Little       AM-PAC Score:  Raw Score: 23   Approx Degree of Impairment: 11.2%   Standardized Score (AM-PAC Scale): 56.93   CMS Modifier (G-Code): CI    FUNCTIONAL ABILITY STATUS  Gait Assessment   Functional Mobility/Gait Assessment  Gait Assistance: Supervision  Distance (ft): 100  Assistive Device: Rolling walker  Pattern: Shuffle    Skilled Therapy Provided     Bed Mobility:  Rolling: indep  Supine to sit: indep   Sit to supine: indep     Transfer Mobility:  Sit to stand: mod I    Stand to sit: mod I   Gait = SBA *vc for RW placement however pt reported prefers to keep further away due to feeling claustrophobic, educated on recommendation for continued use at home which he reports he would consider. Demonstrated variable stepping pattern, peggy, and gait speed throughout session. Exercise/Education Provided:  Functional activity tolerated  Gait training    Patient End of Session: In bed;Needs met;Call light within reach;RN aware of session/findings; All patient questions and concerns addressed; Discussed recommendations with /      Patient Evaluation Complexity Level:  History Low - no personal factors and/or co-morbidities   Examination of body systems Low - addressing 1-2 elements   Clinical Presentation Low - Stable   Clinical Decision Making Low - Stable       PT Session Time: 25 minutes  Gait Training: 10 minutes  Therapeutic Activity:  minutes  Neuromuscular Re-education:  minutes  Therapeutic Exercise:  minutes

## 2023-11-01 NOTE — CM/SW NOTE
Pt with new diagnosis of MS x 1year ago looking for long term care. Pt states unable to walk is s/p fall at doctors office. Is unable to care for self     Spoke with patient mother who also states patient needs long term care, citing she is a senior citizen and unable to care for the patient the level the patient needs. Pt states he doesn't receive SSI / Disability. Pt does have medicaid. Pt doesn't have a preferred facility. Aidin referral initiated.

## 2023-11-01 NOTE — CM/SW NOTE
PT/OT evaluation done. Pt ambulatory with  steady gait using rolling walker. Pt able to see items and maneuver around them safely with rolling walker. Recommendation of PT/OT: discharge home no skilled occupational therapy needs. Outpatient physical therapy. Pt referrals sent and declined by the following:   Naida Tomas at [place] [address] and [address] [place] [address] Rd residence  [facility] Reviewing with DON pending response. Spoke with Shin Reinoso 1808, [facility]eller liaison who stated she wanted to speak to the patient mom prior to accepting patient. Hour has lapsed no accepting LTC facility. pt discharged home. Pt stated had friends to stay with but didn't have a phone to call them. While waiting for a response from [address] [place] [address] pt had began talking on a cell phone. [facility] provided patient with lyft to return to [address] [place] [address].

## 2023-11-01 NOTE — PROGRESS NOTES
HPI:    Patient ID: Michelle Abraham is a 57year old male. HPI  Interim history  Alexa Obrien is a 57year old male who presents for follow up for Multiple sclerosis. States this morning at the  at our office fell while getting out of wheel chair, tripped over foot rest pad , fell on right side of face. States mild pain in the right forehead. Denies any facial pain or eye pain. Has baseline face numbness and numbness in arms and legs and c/o diffuse aches and pain. States he does not feel good but unable to specify. He denies any new focal neurological deficits. He is on Ocrevus infusion for MS last infusion was in August 2023. 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 He states he continues to have issues with his mother and feels frustrated with entire situation and conflict with his mother. He was diagnosed with adjustment disorder with depression/anxiety since the diagnosis of MS on Duloxetine both for mood and neuropathic pain. , not seeing a therapist as recommended    Of note, per care everywhere was taken to [facility] immediate care after verbal altercation with his mother for wellness check. He was admitted in August for possible infection but work up was unremarkable. Initial history   Michelle Abraham is a a(n) 41year old male with newly diagnosed Multiple sclerosis, recent admission for exacerbation directly admitted for new onset right eye vision loss. Patient was just admitted 2 weeks ago at [facility] with increasing weakness, fall and vision loss in the left eye. MRI brain and MRI thoracic spine obtained showed small new lesions in right frontal and left parietal lobe and thoracic spinal cord T5. He was treated with IV Solu-Medrol infusion and was transition to oral steroids.   He states his vision in the left eye never improved and is almost blind in the left eye. He states that the vision in the right eye is getting dark. Patient was unable to get subacute rehab due to insurance issues. Patient is not doing outpatient physical therapy. He is frustrated with his overall health condition and has strained relationship with his mother. Saw Dr Ryan Waters Psychiatrist in hospital and diagnosed with Adjustment disorder with anxiety and depression and individual psychotherapy is recommended. On Gabapentin 300 mg TID          HISTORY:  Past Medical History:   Diagnosis Date    Asthma     Cancer ([address]     Colon cancer ([facility] [address] 2010    Stage 1    4 years ago. Hernia     Hernias x 3 in the abdomen    Multiple sclerosis (HCC)     Prostate CA ([facility] [address]      FATHER     S/P colon resection       Past Surgical History:   Procedure Laterality Date    COLON SURGERY      KNEE ARTHROSCOPY Left     OTHER      Hand surgery    OTHER      Cyst rupture in abdomen, surgical repair    OTHER      abdominal mesh revision     REPAIR ING HERNIA,5+Y/O,REDUCIBL        No family history on file. Social History     Socioeconomic History    Marital status: Single   Tobacco Use    Smoking status: Some Days     Packs/day: .5     Types: Cigarettes    Smokeless tobacco: Never   Vaping Use    Vaping Use: Never used   Substance and Sexual Activity    Alcohol use: Not Currently    Drug use: Yes     Types: Cannabis   Other Topics Concern    Caffeine Concern Yes     Comment: pop 2-3 daily    Exercise Yes     Comment: walking and stretching        Review of Systems   Constitutional:  Positive for fatigue. HENT: Negative. Eyes:  Positive for visual disturbance. Negative for pain. Respiratory: Negative. Cardiovascular: Negative. Gastrointestinal: Negative. Endocrine: Negative. Genitourinary: Negative. Musculoskeletal:  Positive for myalgias. Skin: Negative. Allergic/Immunologic: Negative. Neurological:  Positive for numbness and headaches. Hematological: Negative. Psychiatric/Behavioral: Negative. All other systems reviewed and are negative. Current Outpatient Medications   Medication Sig Dispense Refill    Ocrelizumab (OCREVUS) 300 MG/10ML Intravenous Solution Inject 20 mL (600 mg total) into the vein every 6 (six) months. 20 mL 0    DULoxetine 30 MG Oral Cap DR Particles Take 1 capsule (30 mg total) by mouth 2 (two) times daily. 60 capsule 5    traMADol 50 MG Oral Tab Take 1 tablet (50 mg total) by mouth every 12 (twelve) hours as needed. 60 tablet 5    acetaminophen 325 MG Oral Tab Take 2 tablets (650 mg total) by mouth every 4 (four) hours as needed. diphenhydrAMINE-zinc 2-0.1 % External Cream Apply to BUE for rash until resolved      Multiple Vitamin (QUINTABS) Oral Tab Take 1 tablet by mouth daily. ibuprofen 200 MG Oral Tab Take 2 tablets (400 mg total) by mouth daily as needed for Pain.      gabapentin 300 MG Oral Cap Take 1 capsule (300 mg total) by mouth in the morning, at noon, and at bedtime. (Patient not taking: Reported on 11/1/2023) 90 capsule 2    docusate sodium 100 MG Oral Cap Take 100 mg by mouth 2 (two) times daily. (Patient not taking: Reported on 11/1/2023)      melatonin 3 MG Oral Tab Take 1 tablet by mouth nightly as needed. (Patient not taking: Reported on 11/1/2023)      ALPRAZolam 0.25 MG Oral Tab Take 1 tablet (0.25 mg total) by mouth 2 (two) times daily as needed for Anxiety. (Patient not taking: Reported on 5/24/2023) 12 tablet 0    Omeprazole 40 MG Oral Capsule Delayed Release Take 1 capsule (40 mg total) by mouth daily. (Patient not taking: Reported on 11/1/2023)      albuterol 108 (90 Base) MCG/ACT Inhalation Aero Soln Inhale 2 puffs into the lungs every 4 to 6 hours as needed. (Patient not taking: Reported on 11/1/2023)      cyclobenzaprine 10 MG Oral Tab Take 1 tablet (10 mg total) by mouth 2 (two) times daily as needed for Muscle spasms.  (Patient not taking: Reported on 11/1/2023)       Allergies:  Amoxicillin HIVES  Mushroom Extract Co*    HIVES  Molds & Smuts           RASH  Mushrooms               HIVES  Penicillins             RASH  Radiology Contrast *    RASH    Comment:Possible hives, no anaphylaxis  PHYSICAL EXAM:   Physical Exam  Blood pressure 120/64, pulse 78, resp. rate 16, SpO2 96%. Gen: Awake and in no apparent distress  HEENT:Normocephalic and atraumatic. Has acne like rashes over the forehead  Cardiovascular: Regular rate and rhythm, no murmur  Pulm: breath sounds normal  GI: non-tender, normal bowel sounds  Skin: normal, dry  Psych: flat mood and affect      Neurologic:   MENTAL STATUS: alert, ox3, normal attention, language and fund of knowledge. CRANIAL NERVES II to XII: PERRLA, right VA- 20/80, left- 20/200 can see hand movements  Visual fields not done. no ptosis, EOM intact, facial sensation intact, strong eye closure, face is symmetric, tongue midline        MOTOR EXAMINATION: Normal tone in all limbs    Strength not tested completely today, has diffuse aches/pain and effort limited due to pain    Previous exam  RUE: Delt:  4/5, Bi: 4+/5, Tri: 4/5, : 4/5,   LUE: Delt:  4/5, Bi: 4+/5, Tri: 4/5, : 4/5,   RLE: HF: 4/5, KE: 4/5, KF: 4/5, DF: 4/5, PF: 4/5   LLE:  HF: 4/5, KE: 4/5, KF: 4/5, DF: 4/5, PF: 4/5             SENSORY EXAMINATION:  UE: spotty decreased sensation to light touch and pinprick in all dermatomes   LE: spotty decreased sensation to light touch      COORDINATION:  No dysmetria, or intention tremors      REFLEXES: 4 at biceps, 2+ brachioradialis, 2+ at patella      GAIT: deferred       TESTS/IMAGING:       MRI brain w and wo contrast: Dec 2022        Impression   CONCLUSION:       1. There is moderate burden of demyelinating plaques within the supratentorial and infratentorial brain without active or new demyelinating plaque at this time.      2.  There is mild increased T2 signal and possible mild enhancement within the right optic nerve may be due to mild optic neuritis. ASSESSMENT/PLAN:     (G35) MS (multiple sclerosis) ([facility] [address]  (primary encounter diagnosis)  Plan: MRI BRAIN (W+WO) (CPT=70553), MRI         CERVICAL+THORACIC SPINE (ALL W+WO)         (CPT=72156/21175), Ophthalmology Referral - In         Network          (R20.0,  R20.2) Numbness and tingling of both upper extremities      (R26.9) Gait abnormality      (H54.7) Poor vision  Plan: Ophthalmology Referral - In Network      Surveillance MRI brain and MRI cervical and thoracic spine ( due Dec 2023)    2. Continue Ocrevus infusion every 6 months, last one in August 2023    3. Again suggested to see Neuro- Ophthalmology- Dr Missael Watt, referral placed    4. Increase dose of Duloxetine slowly to 60 mg BID for both mood disorder and neuropathic pain   Continue Gabapentin 300 mg TID. 5.  Need to follow with behavioral health [facility]. Would benefit from individual psychotherapy    6. Completed home PT/OT. Has a  to assist.  Encourage using walker at home and fall precautions        Addendum: at the end of visit patient stated he does not think he can go home, feels fatigue and emotionally overwhelmed, crying states whole body aches. He was directed to the ED for evaluation and potential admission.        Total 45 minutes spent with patient >50% of visit was spent in counseling and coordination of care      Michael Jacques MD  [address] [facility] [place] This Visit:  Requested Prescriptions      No prescriptions requested or ordered in this encounter       Imaging & Referrals:  None     XO#[id number]

## 2023-11-01 NOTE — DISCHARGE INSTRUCTIONS
Below is a list of referrals that you should follow-up with for treatment. If you feel unsafe with self or experience an increase in symptoms, please return to nearest ED or call 911.     [address]  [phone]    [facility]  [address], [address] [address]  [phone]    [address], [address]  [phone]    [facility]  [address], [address] [address]  [phone]    [facility] [address]  [place], [address]  [phone]    [facility] (Telehealth)  [address], [address] [facility]  [phone] for Individual Development  [address], [address] [address]  [phone]    [facility]  [address], [address]  [phone]    [facility]AB[facility]  [address]  [phone]    [facility]  [address]  [place], [address] [address]  [phone]    Team [address]  [address], [address] [facility]  [phone]    [facility], [address] [facility], [address]  [phone]  Offers online therapy      Additional Location   [facility]  [address]  [phone]

## 2023-11-01 NOTE — ED INITIAL ASSESSMENT (HPI)
PT PRESENTS TO ED AFTER HE FELL OUT OF WHEELCHAIR TODAY. PT HIT HIS HEAD, HAS HAD MULTIPLE FALLS RECENTLY. RECENTLY DX WITH MS.  WAS SEEING NEUROLOGIST TODAY. PT IS VERY AGITATED.   PT SENT BY NEURO FOR ADMISSION

## 2023-12-05 RX ORDER — TRAMADOL HYDROCHLORIDE 50 MG/1
50 TABLET ORAL EVERY 12 HOURS PRN
Qty: 60 TABLET | Refills: 0 | OUTPATIENT
Start: 2023-12-05

## 2023-12-05 NOTE — TELEPHONE ENCOUNTER
Refused. Discontinued by provider. Medication: TRAMADOL 50 MG Oral Tab      Date of last refill: 05/24/2023 (#60/5)- discon  [place]     Last office visit: 11/01/2023  Due back to clinic per last office note:  Around 05/01/2024  Date next office visit scheduled:    No future appointments. Last OV note recommendation:    ASSESSMENT/PLAN:      (G35) MS (multiple sclerosis) ([facility] [address]  (primary encounter diagnosis)  Plan: MRI BRAIN (W+WO) (CPT=70553), MRI         CERVICAL+THORACIC SPINE (ALL W+WO)         (CPT=72156/63809), Ophthalmology Referral - In         Network           (R20.0,  R20.2) Numbness and tingling of both upper extremities        (R26.9) Gait abnormality        (H54.7) Poor vision  Plan: Ophthalmology Referral - In Network        Surveillance MRI brain and MRI cervical and thoracic spine ( due Dec 2023)     2. Continue Ocrevus infusion every 6 months, last one in August 2023     3. Again suggested to see Neuro- Ophthalmology- Dr Epi Peterson, referral placed     4. Increase dose of Duloxetine slowly to 60 mg BID for both mood disorder and neuropathic pain   Continue Gabapentin 300 mg TID. 5.  [facility]. Would benefit from individual psychotherapy     6. Completed home PT/OT. Has a  to assist.  Encourage using walker at home and fall precautions           Addendum: at the end of visit patient stated he does not think he can go home, feels fatigue and emotionally overwhelmed, crying states whole body aches. He was directed to the ED for evaluation and potential admission.         Total 45 minutes spent with patient >50% of visit was spent in counseling and coordination of care

## 2023-12-11 RX ORDER — TRAMADOL HYDROCHLORIDE 50 MG/1
50 TABLET ORAL EVERY 12 HOURS PRN
Qty: 60 TABLET | Refills: 0 | OUTPATIENT
Start: 2023-12-11

## 2023-12-11 NOTE — TELEPHONE ENCOUNTER
LOV 11/01/2023 discontinued tramadol and ordered Duloxetine 60 mg BID       Medication: TRAMADOL 50 MG Oral Tab     Date of last refill: 05/24/23 (60/5)  [place] 63/78/64    Last office visit: 11/01/23  Due back to clinic per last office note:  6 months  Date next office visit scheduled:  No future appointments. Last OV note recommendation:     /PLAN:      Surveillance MRI brain and MRI cervical and thoracic spine ( due Dec 2023)     2. Continue Ocrevus infusion every 6 months, last one in August 2023     3. Again suggested to see Neuro- Ophthalmology- Dr Mook Nayak, referral placed     4. Increase dose of Duloxetine slowly to 60 mg BID for both mood disorder and neuropathic pain   Continue Gabapentin 300 mg TID. 5.  Need to follow with behavioral health [facility]. Would benefit from individual psychotherapy     6. Completed home PT/OT.   Has a  to assist.  Encourage using walker at home and fall precautions

## 2023-12-14 ENCOUNTER — TELEPHONE (OUTPATIENT)
Dept: NEUROLOGY | Facility: CLINIC | Age: 48
End: 2023-12-14

## 2023-12-14 DIAGNOSIS — G35 MS (MULTIPLE SCLEROSIS) (HCC): Primary | ICD-10-CM

## 2023-12-14 RX ORDER — DIPHENHYDRAMINE HYDROCHLORIDE 50 MG/ML
50 INJECTION INTRAMUSCULAR; INTRAVENOUS ONCE
Status: CANCELLED | OUTPATIENT
Start: 2024-02-07

## 2023-12-14 RX ORDER — OCRELIZUMAB 300 MG/10ML
600 INJECTION INTRAVENOUS
Qty: 20 ML | Refills: 0 | Status: SHIPPED | OUTPATIENT
Start: 2024-02-08

## 2023-12-14 RX ORDER — ACETAMINOPHEN 325 MG/1
650 TABLET ORAL ONCE
Status: CANCELLED | OUTPATIENT
Start: 2024-02-07

## 2023-12-14 RX ORDER — METHYLPREDNISOLONE SODIUM SUCCINATE 125 MG/2ML
100 INJECTION, POWDER, LYOPHILIZED, FOR SOLUTION INTRAMUSCULAR; INTRAVENOUS ONCE
Status: CANCELLED | OUTPATIENT
Start: 2024-02-07

## 2023-12-14 NOTE — TELEPHONE ENCOUNTER
PA approval from previous TE  Spoke with [employer] representative ( Kaley ALLEN ) and PA for Ocrevus completed over phone ().  Ocrevus approved, Case# [id number], Approval # [id number] and is approved 8/6/2023-8/5/2024.  PA not needed for administration at / infusion center per Ermelinda herrera our PA Dept.  Approval letter will be faxed to [phone] within the next 24 hours.    New order placed for Ocrevus. PA started through [employer].     LM for mother Julia regarding above and tramadol.

## 2023-12-14 NOTE — TELEPHONE ENCOUNTER
Pt's mother calling asking if nurse Maddy is going to set up Ocrevus treatment for Feb and requesting refill for Tramadol. Mother states she still gives pt Tramadol on occasion. Please call to discuss.

## 2023-12-15 NOTE — TELEPHONE ENCOUNTER
Authorized from January 20, 2024 to January 20, 2025   Information received electronically from payer    Received from [employer] for Ocrevus infusion.

## 2023-12-20 NOTE — TELEPHONE ENCOUNTER
Patients mother advised.. [facility] Advised mother she will have to call and authorize shipping to the cancer center. Julia will call tomorrow to have shipped.

## 2024-01-03 ENCOUNTER — TELEPHONE (OUTPATIENT)
Dept: HEMATOLOGY/ONCOLOGY | Facility: HOSPITAL | Age: 49
End: 2024-01-03

## 2024-01-03 NOTE — TELEPHONE ENCOUNTER
dominique patients mom calling needs to set  up appt for her son. she said arielle will not send out medication until Trav has appointment. she can be reached at [phone]. thank you dianne

## 2024-02-02 ENCOUNTER — OFFICE VISIT (OUTPATIENT)
Dept: HEMATOLOGY/ONCOLOGY | Facility: HOSPITAL | Age: 49
End: 2024-02-02
Attending: Other
Payer: COMMERCIAL

## 2024-02-02 VITALS
BODY MASS INDEX: 29 KG/M2 | TEMPERATURE: 98 F | WEIGHT: 180.19 LBS | OXYGEN SATURATION: 97 % | SYSTOLIC BLOOD PRESSURE: 119 MMHG | DIASTOLIC BLOOD PRESSURE: 80 MMHG | HEART RATE: 68 BPM | RESPIRATION RATE: 16 BRPM

## 2024-02-02 DIAGNOSIS — G35 MS (MULTIPLE SCLEROSIS) (HCC): Primary | ICD-10-CM

## 2024-02-02 PROCEDURE — 96365 THER/PROPH/DIAG IV INF INIT: CPT

## 2024-02-02 PROCEDURE — 96375 TX/PRO/DX INJ NEW DRUG ADDON: CPT

## 2024-02-02 PROCEDURE — 96366 THER/PROPH/DIAG IV INF ADDON: CPT

## 2024-02-02 RX ORDER — METHYLPREDNISOLONE SODIUM SUCCINATE 125 MG/2ML
100 INJECTION, POWDER, LYOPHILIZED, FOR SOLUTION INTRAMUSCULAR; INTRAVENOUS ONCE
Status: CANCELLED | OUTPATIENT
Start: 2024-02-07

## 2024-02-02 RX ORDER — DIPHENHYDRAMINE HYDROCHLORIDE 50 MG/ML
50 INJECTION INTRAMUSCULAR; INTRAVENOUS ONCE
Status: COMPLETED | OUTPATIENT
Start: 2024-02-02 | End: 2024-02-02

## 2024-02-02 RX ORDER — DIPHENHYDRAMINE HYDROCHLORIDE 50 MG/ML
50 INJECTION INTRAMUSCULAR; INTRAVENOUS ONCE
Status: CANCELLED | OUTPATIENT
Start: 2024-02-07

## 2024-02-02 RX ORDER — ACETAMINOPHEN 325 MG/1
650 TABLET ORAL ONCE
Status: COMPLETED | OUTPATIENT
Start: 2024-02-02 | End: 2024-02-02

## 2024-02-02 RX ORDER — ACETAMINOPHEN 325 MG/1
650 TABLET ORAL ONCE
Status: CANCELLED | OUTPATIENT
Start: 2024-02-07

## 2024-02-02 RX ORDER — METHYLPREDNISOLONE SODIUM SUCCINATE 125 MG/2ML
100 INJECTION, POWDER, LYOPHILIZED, FOR SOLUTION INTRAMUSCULAR; INTRAVENOUS ONCE
Status: COMPLETED | OUTPATIENT
Start: 2024-02-02 | End: 2024-02-02

## 2024-02-02 RX ADMIN — METHYLPREDNISOLONE SODIUM SUCCINATE 100 MG: 125 INJECTION, POWDER, LYOPHILIZED, FOR SOLUTION INTRAMUSCULAR; INTRAVENOUS at 09:01:00

## 2024-02-02 RX ADMIN — DIPHENHYDRAMINE HYDROCHLORIDE 50 MG: 50 INJECTION INTRAMUSCULAR; INTRAVENOUS at 09:00:00

## 2024-02-02 RX ADMIN — ACETAMINOPHEN 650 MG: 325 TABLET ORAL at 08:59:00

## 2024-02-02 NOTE — PROGRESS NOTES
Pt here for Ocrevus . Pt denies any issues or concerns.      Ordering MD: Dr. Machuca  Order Exp: Today     Pt tolerated infusion without difficulty or complaint. Reviewed next apt date/time: 8/2/24 830am      Education Record  Learner:  Patient  Disease / Diagnosis: MS  Barriers / Limitations:  None  Method:  Brief focused and Reinforcement  General Topics:  Plan of care reviewed  Outcome:  Shows understanding      Patient observed x 1 hour post infusion. [place] [facility] called for patient to be transported back to where he's currently residing. Patient discharged in stable condition.

## 2024-04-05 ENCOUNTER — TELEPHONE (OUTPATIENT)
Dept: NEUROLOGY | Facility: CLINIC | Age: 49
End: 2024-04-05

## 2024-04-05 NOTE — TELEPHONE ENCOUNTER
Pt's mother is afraid he may hurt himself or her. He is talking suicide, he thinks people are stealing from him. She is looking for recommendation for Psychiatry. Please advise, Mother's best call back number is [phone]. Endorsed to RN for Provider.

## 2024-04-05 NOTE — TELEPHONE ENCOUNTER
Spoke to patients mother. She states they are currently staying at the [facility].     She states patient is not following her suggestions and yells at her when she tries to talk to him. She states that patient reports he wants to kill himself. Advised that if he threatens harm to himself or others she needs to take him to the ER or call 911. She states she does not want him arrested because then he will not get care.     She states she has tried to call his insurance to find out a psychiatrist for patient but they will not talk to her since she is not on his HIPAA.     Advised Julia to call other psychiatrist offices to get him scheduled but if he does not want to go she can not make him.    Advised he is an adult and can make his own decisions. She states we keep telling her that and not helping.     Advised patients mother that we can not make the patient do anything he does not want to do. Advised that we are not able to help with his suicidal thoughts and that it would be best to send him to the ER or call 911.    She then states she needed a letter stating he has MS. Advised patients mother that she can use his last office visit and this should be enough information to give to S.S. for patient to obtain help so he can be placed in a assisted living. Advised that he will need to establish with a primary care provider to received more help.    Julia will come  a copy of the LOV so she can take to S.S office to help patient get benefits.     Will route to provider as SLY.

## 2024-05-01 ENCOUNTER — OFFICE VISIT (OUTPATIENT)
Dept: NEUROLOGY | Facility: CLINIC | Age: 49
End: 2024-05-01
Payer: MEDICAID

## 2024-05-01 VITALS
SYSTOLIC BLOOD PRESSURE: 122 MMHG | HEART RATE: 78 BPM | WEIGHT: 118 LBS | RESPIRATION RATE: 16 BRPM | BODY MASS INDEX: 19 KG/M2 | DIASTOLIC BLOOD PRESSURE: 68 MMHG

## 2024-05-01 DIAGNOSIS — R26.0 ATAXIC GAIT: ICD-10-CM

## 2024-05-01 DIAGNOSIS — H54.3 DECREASED VISION IN BOTH EYES: ICD-10-CM

## 2024-05-01 DIAGNOSIS — G35 MS (MULTIPLE SCLEROSIS) (HCC): Primary | ICD-10-CM

## 2024-05-01 DIAGNOSIS — Z86.69 HISTORY OF OPTIC NEURITIS: ICD-10-CM

## 2024-05-01 PROCEDURE — 99214 OFFICE O/P EST MOD 30 MIN: CPT | Performed by: OTHER

## 2024-05-01 RX ORDER — GABAPENTIN 300 MG/1
300 CAPSULE ORAL 3 TIMES DAILY
Qty: 90 CAPSULE | Refills: 5 | Status: SHIPPED | OUTPATIENT
Start: 2024-05-01

## 2024-05-01 RX ORDER — DULOXETIN HYDROCHLORIDE 60 MG/1
60 CAPSULE, DELAYED RELEASE ORAL 2 TIMES DAILY
Qty: 60 CAPSULE | Refills: 5 | Status: SHIPPED | OUTPATIENT
Start: 2024-05-01

## 2024-05-01 NOTE — PROGRESS NOTES
HPI:    Patient ID: Trav Isaac is a 48 year old male.    Neurologic Problem  Associated symptoms include fatigue. Pertinent negatives include no headaches.   Multiple Sclerosis  Associated symptoms include fatigue and numbness. Pertinent negatives include no headaches.       Patient is a 48-year-old male who presented for follow-up for Multiple sclerosis.    Doing well and denies any interval new neurological symptoms  C/o fatigue, continues to have poor vision in both eyes and gait abnormality.  He didn't saw Neuro-ophthalmology as recommended    States his mother sold the home and now he is living in a hotel until he finds a new place.  He didn't completed a surveillance MRI brain and MRI spine.  On Ocrevus infusion and last infusion was in Feb 2024 tolerating it fine        Office visit: Nov 2023      Trav Isaac is a 48 year old male who presents for follow up for Multiple sclerosis.States this morning at the  at our office fell while getting out of wheel chair, tripped over foot rest pad , fell on right side of face. States mild pain in the right forehead. Denies any facial pain or eye pain.  Has baseline face numbness and numbness in arms and legs and c/o diffuse aches and pain. States he does not feel good but unable to specify. He denies any new focal neurological deficits. He is on Ocrevus infusion for MS last infusion was in August 2023.    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  He states he continues to have issues with his mother and feels frustrated with entire situation and conflict with his mother. He was diagnosed with adjustment disorder with depression/anxiety since the diagnosis of MS on Duloxetine both for mood and neuropathic pain., not seeing a therapist as recommended    Of note, per care everywhere was taken to [facility] after  verbal altercation with his mother for wellness check. He was admitted in August for possible infection but work up was unremarkable.         Initial history   Trav Isaac is a a(n) 47 year old male with newly diagnosed Multiple sclerosis, recent admission for exacerbation directly admitted for new onset right eye vision loss.  Patient was just admitted 2 weeks ago at [facility] with increasing weakness, fall and vision loss in the left eye. MRI brain and MRI thoracic spine obtained showed small new lesions in right frontal and left parietal lobe and thoracic spinal cord T5.  He was treated with IV Solu-Medrol infusion and was transition to oral steroids.  He states his vision in the left eye never improved and is almost blind in the left eye.  He states that the vision in the right eye is getting dark.  Patient was unable to get subacute rehab due to insurance issues.  Patient is not doing outpatient physical therapy.  He is frustrated with his overall health condition and has strained relationship with his mother. Saw Dr Cespedes Psychiatrist in hospital and diagnosed with Adjustment disorder with anxiety and depression and individual psychotherapy is recommended. On Gabapentin 300 mg TID          HISTORY:  Past Medical History:    Asthma (HCC)    Cancer (HCC)    Colon cancer (HCC)    Stage 1    4 years ago.    Hernia    Hernias x 3 in the abdomen    Multiple sclerosis (HCC)    Prostate CA (HCC)     FATHER     S/P colon resection      Past Surgical History:   Procedure Laterality Date    Colon surgery      Knee arthroscopy Left     Other      Hand surgery    Other      Cyst rupture in abdomen, surgical repair    Other      abdominal mesh revision     Other surgical history      adominal mesh    Repair ing hernia,5+y/o,reducibl        No family history on file.   Social History     Socioeconomic History    Marital status: Single   Tobacco Use    Smoking status: Some Days     Current packs/day: 0.50     Types:  Cigarettes    Smokeless tobacco: Never   Vaping Use    Vaping status: Never Used   Substance and Sexual Activity    Alcohol use: Not Currently    Drug use: Yes     Types: Cannabis   Other Topics Concern    Caffeine Concern Yes     Comment: pop 2-3 daily    Exercise Yes     Comment: walking and stretching     Social Determinants of Health     Food Insecurity: Food Insecurity Present (8/8/2023)    [facility], [facility]    Food Insecurity     Currently or in the past 3 months, have you worried your food would run out before you had money to buy more?: Yes     In the past 12 months, have you run out of food or been unable to get more?: Yes   Transportation Needs: No Transportation Needs (8/8/2023)    Received from [facility], [facility]    Transportation Needs     Medical Transportation Needs?: No    Received from [facility], [facility]    Social Connections        Review of Systems   Constitutional:  Positive for fatigue.   HENT: Negative.     Eyes:  Positive for visual disturbance. Negative for pain.   Respiratory: Negative.     Cardiovascular: Negative.    Gastrointestinal: Negative.    Endocrine: Negative.    Genitourinary: Negative.    Musculoskeletal:  Positive for gait problem.   Skin: Negative.    Allergic/Immunologic: Negative.    Neurological:  Positive for numbness. Negative for headaches.   Hematological: Negative.    Psychiatric/Behavioral: Negative.     All other systems reviewed and are negative.           Current Outpatient Medications   Medication Sig Dispense Refill    gabapentin 300 MG Oral Cap Take 1 capsule (300 mg total) by mouth in the morning, at noon, and at bedtime. 90 capsule 5    DULoxetine 60 MG Oral Cap DR Particles Take 1 capsule (60 mg total) by mouth 2 (two) times daily. 60 capsule 5    Ocrelizumab (OCREVUS) 300 MG/10ML Intravenous Solution Inject 20 mL  (600 mg total) into the vein every 6 (six) months. 20 mL 0    diphenhydrAMINE-zinc 2-0.1 % External Cream Apply to BUE for rash until resolved      Multiple Vitamin (QUINTABS) Oral Tab Take 1 tablet by mouth daily.       Allergies:  Allergies   Allergen Reactions    Amoxicillin HIVES    Mushroom Extract Complex HIVES    Molds & Smuts RASH    Mushrooms HIVES    Penicillins RASH    Radiology Contrast Iodinated Dyes RASH     Possible hives, no anaphylaxis     PHYSICAL EXAM:   Physical Exam  Blood pressure 122/68, pulse 78, resp. rate 16, weight 118 lb (53.5 kg).        General Appearance: Well nourished, well developed, no apparent distress.  HEENT: Normocephalic and atraumatic.   Cardiovascular: Normal rate, regular rhythm and normal heart sounds.    Pulmonary/Chest: Effort normal and breath sounds normal.   Abdominal: Soft. Bowel sounds are normal.   Skin: dry, clean and intact  Ext: peripheral pulses present  Psych: normal mood and affect    Neurological:  Patient is awake, alert and oriented to person, place and time   Normal attention/concentration, speech and language.    Cranial Nerves:   II: Visual acuity: Poor vision in both eyes right 20/80, left 20/200 can see hand movements  III: Pupils: equal, round, reactive to light  III,IV,VI: Extra Ocular Movements: intact  V: Facial sensation: intact  VII: Facial strength: intact  VIII: Hearing: intact  IX: Palate: intact  XI: Shoulder shrug: intact  XII: Tongue movement: normal    Motor: Normal tone.  Strength is about 5/5 in bilateral UE  and and 4/5 bilaterally LE     Sensory: Patchy decreased sensation in all limbs    Coordination: Finger-to-nose test normal bilaterally without evidence of dysmetria.    Gait: deferred       According to the Expanded Disability Status Scale (EDSS) method of quantifying disability in patients suffering from multiple sclerosis, the current level for the patient in question is 7.0.    This is characterized in the scale by the  following: Unable to walk beyond approximately 5 m even with aid; essentially restricted to wheelchair; wheels self in standard wheelchair and transfers alone; up and about approximately 12 hr/day (usual FS equivalents are combinations with more than 1 FS grade 4+; very rarely, pyramidal grade 5 alone).       TESTS/IMAGING:       MRI brain w and wo contrast: Dec 2022        Impression   CONCLUSION:       1.  There is moderate burden of demyelinating plaques within the supratentorial and infratentorial brain without active or new demyelinating plaque at this time.     2.  There is mild increased T2 signal and possible mild enhancement within the right optic nerve may be due to mild optic neuritis.            ASSESSMENT/PLAN:       ICD-10-CM    1. MS (multiple sclerosis) ([facility])  G35       2. Ataxic gait  R26.0       3. Decreased vision in both eyes  H54.3       4. History of optic neuritis  Z86.69             Patient is a 48-year-old male who presented for follow-up for multiple sclerosis.  Overall he is stable      Complete Surveillance MRI brain and MRI cervical and thoracic spine ( order valid till Nov 2024)    2. Continue Ocrevus infusion every 6 months, last one in Feb 2024  Annual CBC and CMP     3.  Neuro- Ophthalmology- Dr Hutchison, evaluation pending    4.  Continue Duloxetine slowly to 60 mg BID for both mood disorder and neuropathic pain   Continue Gabapentin 300 mg TID.    5.  Follow up with behavioral health ( referral given multiple times)    Patient is established with  [facility] and has a  to assist      Follow-up in about 6 months    Melissa Machuca MD  [facility] [place]      Meds This Visit:  Requested Prescriptions     Signed Prescriptions Disp Refills    gabapentin 300 MG Oral Cap 90 capsule 5     Sig: Take 1 capsule (300 mg total) by mouth in the morning, at noon, and at bedtime.    DULoxetine 60 MG Oral Cap DR Particles 60 capsule 5     Sig: Take 1 capsule (60 mg  total) by mouth 2 (two) times daily.       Imaging & Referrals:  None     ID#[id number]

## 2024-05-01 NOTE — PATIENT INSTRUCTIONS
Call central scheduling [phone] for Radiology  for MRI brain and MRI cervical thoracic lumbar spine      2. Continue Ocrevus infusion every 6 months    Annual lab work        3. Follow up in 1 year. Will call with MRI results                      Refill policies:    Allow 2-3 business days for refills; controlled substances may take longer.  Contact your pharmacy at least 5 days prior to running out of medication and have them send an electronic request or submit request through the “request refill” option in your [email or web address] account.  Refills are not addressed on weekends; covering physicians do not authorize routine medications on weekends.  No narcotics or controlled substances are refilled after noon on Fridays or by on call physicians.  By law, narcotics must be electronically prescribed.  A 30 day supply with no refills is the maximum allowed.  If your prescription is due for a refill, you may be due for a follow up appointment.  To best provide you care, patients receiving routine medications need to be seen at least once a year.  Patients receiving narcotic/controlled substance medications need to be seen at least once every 3 months.  In the event that your preferred pharmacy does not have the requested medication in stock (e.g. Backordered), it is your responsibility to find another pharmacy that has the requested medication available.  We will gladly send a new prescription to that pharmacy at your request.    Scheduling Tests:    If your physician has ordered radiology tests such as MRI or CT scans, please contact Central Scheduling at [phone] right away to schedule the test.  Once scheduled, the [facility] Centralized Referral Team will work with your insurance carrier to obtain pre-certification or prior authorization.  Depending on your insurance carrier, approval may take 3-10 days.  It is highly recommended patients assure they have received an authorization before having a test performed.  If  test is done without insurance authorization, patient may be responsible for the entire amount billed.      Precertification and Prior Authorizations:  If your physician has recommended that you have a procedure or additional testing performed the [facility] Centralized Referral Team will contact your insurance carrier to obtain pre-certification or prior authorization.    You are strongly encouraged to contact your insurance carrier to verify that your procedure/test has been approved and is a COVERED benefit.  Although the [facility] Centralized Referral Team does its due diligence, the insurance carrier gives the disclaimer that \"Although the procedure is authorized, this does not guarantee payment.\"    Ultimately the patient is responsible for payment.   Thank you for your understanding in this matter.  Paperwork Completion:  If you require FMLA or disability paperwork for your recovery, please make sure to either drop it off or have it faxed to our office at [phone]. Be sure the form has your name and date of birth on it.  The form will be faxed to our Forms Department and they will complete it for you.  There is a 25$ fee for all forms that need to be filled out.  Please be aware there is a 10-14 day turnaround time.  You will need to sign a release of information (SONU) form if your paperwork does not come with one.  You may call the Forms Department with any questions at [phone].  Their fax number is [phone].

## 2024-05-01 NOTE — PROGRESS NOTES
HPI:    Patient ID: Trav Isaac is a 48 year old male.    Neurologic Problem  Associated symptoms include fatigue.   Multiple Sclerosis  Associated symptoms include fatigue. Pertinent negatives include no numbness.     Interim history  Trav Isaac is a 48 year old male who presents for follow up for Multiple sclerosis.       He is on Ocrevus infusion for MS last infusion was in Feb 2024      He was diagnosed with adjustment disorder with depression/anxiety since the diagnosis of MS on Duloxetine both for mood and neuropathic pain., not seeing a therapist as recommended            Initial history   Trav Isaac is a a(n) 47 year old male with newly diagnosed Multiple sclerosis, recent admission for exacerbation directly admitted for new onset right eye vision loss.  Patient was just admitted 2 weeks ago at [facility] with increasing weakness, fall and vision loss in the left eye. MRI brain and MRI thoracic spine obtained showed small new lesions in right frontal and left parietal lobe and thoracic spinal cord T5.  He was treated with IV Solu-Medrol infusion and was transition to oral steroids.  He states his vision in the left eye never improved and is almost blind in the left eye.  He states that the vision in the right eye is getting dark.  Patient was unable to get subacute rehab due to insurance issues.  Patient is not doing outpatient physical therapy.  He is frustrated with his overall health condition and has strained relationship with his mother. Saw Dr Cespedes Psychiatrist in hospital and diagnosed with Adjustment disorder with anxiety and depression and individual psychotherapy is recommended. On Gabapentin 300 mg TID          HISTORY:  Past Medical History:    Asthma (HCC)    Cancer (HCC)    Colon cancer (HCC)    Stage 1    4 years ago.    Hernia    Hernias x 3 in the abdomen    Multiple sclerosis (HCC)    Prostate CA (HCC)     FATHER     S/P colon resection      Past Surgical History:    Procedure Laterality Date    Colon surgery      Knee arthroscopy Left     Other      Hand surgery    Other      Cyst rupture in abdomen, surgical repair    Other      abdominal mesh revision     Other surgical history      adominal mesh    Repair ing hernia,5+y/o,reducibl        No family history on file.   Social History     Socioeconomic History    Marital status: Single   Tobacco Use    Smoking status: Some Days     Current packs/day: 0.50     Types: Cigarettes    Smokeless tobacco: Never   Vaping Use    Vaping status: Never Used   Substance and Sexual Activity    Alcohol use: Not Currently    Drug use: Yes     Types: Cannabis   Other Topics Concern    Caffeine Concern Yes     Comment: pop 2-3 daily    Exercise Yes     Comment: walking and stretching     Social Determinants of Health     Food Insecurity: Food Insecurity Present (8/8/2023)    [facility]    Food Insecurity     Currently or in the past 3 months, have you worried your food would run out before you had money to buy more?: Yes     In the past 12 months, have you run out of food or been unable to get more?: Yes   Transportation Needs: No Transportation Needs (8/8/2023)    [facility]    Transportation Needs     Medical Transportation Needs?: No    [facility]    Social Connections        Review of Systems   Constitutional:  Positive for fatigue.   HENT: Negative.     Eyes:  Positive for visual disturbance. Negative for pain.   Respiratory: Negative.     Cardiovascular: Negative.    Gastrointestinal: Negative.    Endocrine: Negative.    Genitourinary: Negative.    Musculoskeletal:  Positive for gait problem.   Skin: Negative.    Allergic/Immunologic: Negative.    Neurological:  Negative for numbness.   Hematological: Negative.    Psychiatric/Behavioral:  Negative.     All other systems reviewed and are negative.           Current Outpatient Medications   Medication Sig Dispense Refill    Ocrelizumab (OCREVUS) 300 MG/10ML Intravenous Solution Inject 20 mL (600 mg total) into the vein every 6 (six) months. 20 mL 0    DULoxetine 60 MG Oral Cap DR Particles Take 1 capsule (60 mg total) by mouth 2 (two) times daily. 60 capsule 5    gabapentin 300 MG Oral Cap Take 1 capsule (300 mg total) by mouth in the morning, at noon, and at bedtime. 90 capsule 2    diphenhydrAMINE-zinc 2-0.1 % External Cream Apply to BUE for rash until resolved      Multiple Vitamin (QUINTABS) Oral Tab Take 1 tablet by mouth daily.       Allergies:  Allergies   Allergen Reactions    Amoxicillin HIVES    Mushroom Extract Complex HIVES    Molds & Smuts RASH    Mushrooms HIVES    Penicillins RASH    Radiology Contrast Iodinated Dyes RASH     Possible hives, no anaphylaxis     PHYSICAL EXAM:   Physical Exam  Blood pressure 122/68, pulse 78, resp. rate 16, weight 118 lb (53.5 kg).      General Appearance: Well nourished, well developed, no apparent distress.   HEENT: Normocephalic and atraumatic. Normal sclera.   Cardiovascular: Normal rate, regular rhythm and normal heart sounds.    Pulmonary/Chest: Effort normal and breath sounds normal.   Abdominal: Soft. Bowel sounds are normal.   Skin: dry, clean and intact  Ext: peripheral pulses present  Psych: normal mood and affect    Neurological:  Patient is awake, alert and oriented to person, place and time   Speech is fluent with intact comprehension.     Cranial Nerves:   II: Visual acuity right VA- 20/80, left- 20/200 can see hand movements  III: Pupils: equal, round, reactive to light  III,IV,VI: Extra Ocular Movements: intact  V: Facial sensation: intact  VII: Facial strength: intact  VIII: Hearing: intact  IX: Palate: intact  XI: Shoulder shrug: intact  XII: Tongue movement: normal    Motor: Normal tone. Strength is  5 out of 5 in BUE and 4+/5 in BLE  except DF 4/5 bilaterally  DTR: present    Sensory: Sensory examination is normal to light touch and pinprick     Coordination: Finger-to-nose test normal bilaterally without evidence of dysmetria.    Gait: deferred         TESTS/IMAGING:       MRI brain w and wo contrast: Dec 2022        Impression   CONCLUSION:       1.  There is moderate burden of demyelinating plaques within the supratentorial and infratentorial brain without active or new demyelinating plaque at this time.     2.  There is mild increased T2 signal and possible mild enhancement within the right optic nerve may be due to mild optic neuritis.            ASSESSMENT/PLAN:     (G35) MS (multiple sclerosis) ([facility])  (primary encounter diagnosis)  Plan: MRI BRAIN (W+WO) (CPT=70553), MRI         CERVICAL+THORACIC SPINE (ALL W+WO)         (CPT=72156/40316), Ophthalmology Referral - In         Network          (R20.0,  R20.2) Numbness and tingling of both upper extremities      (R26.9) Gait abnormality      (H54.7) Poor vision  Plan: Ophthalmology Referral - In Network      Surveillance MRI brain and MRI cervical and thoracic spine ( due Dec 2023)    2. Continue Ocrevus infusion every 6 months, last one in August 2023    3.  Again suggested to see Neuro- Ophthalmology- Dr Hutchison, referral placed    4.  Increase dose of Duloxetine slowly to 60 mg BID for both mood disorder and neuropathic pain   Continue Gabapentin 300 mg TID.    5.  Need to follow with behavioral health [facility]. Would benefit from individual psychotherapy    6.  Completed home PT/OT.  Has a  to assist.  Encourage using walker at home and fall precautions        Addendum: at the end of visit patient stated he does not think he can go home, feels fatigue and emotionally overwhelmed, crying states whole body aches. He was directed to the ED for evaluation and potential admission.       Total 45 minutes spent with patient >50% of visit was spent in counseling and coordination of  care      Melissa Machuca MD  [facility] [place]      Meds This Visit:  Requested Prescriptions      No prescriptions requested or ordered in this encounter       Imaging & Referrals:  None     ID#[id number]

## 2024-06-28 ENCOUNTER — TELEPHONE (OUTPATIENT)
Dept: NEUROLOGY | Facility: CLINIC | Age: 49
End: 2024-06-28

## 2024-07-22 ENCOUNTER — TELEPHONE (OUTPATIENT)
Dept: NEUROLOGY | Facility: CLINIC | Age: 49
End: 2024-07-22

## 2024-07-22 NOTE — TELEPHONE ENCOUNTER
Called number in PSR notes and the number is for the [facility].    Called two times and unable to reach Neyda REYNAGA.

## 2024-07-22 NOTE — TELEPHONE ENCOUNTER
Pt's mom called to inform office pt now resides at [facility].Ph# is [phone].Mother asking nursing to reach out to pt to coordinate his care. His nurse Neyda is aware that [facility] contacted mother and [facility]. Psr asked mother if she needed to speak to nursing RE: verbal release to speak to mom or POA etc. Mother said no.

## 2024-07-22 NOTE — TELEPHONE ENCOUNTER
Called living facility and updated them on phone number and address of provider's office. Advised to call office with MS related questions.

## 2024-07-22 NOTE — TELEPHONE ENCOUNTER
Received paperwork for provider to complete for non emergent ambulance transport for pt's office visits and infusion.    Pt is currently living in SNF    Completed for neuro appts and infusions only and placed in provider's folder for review and signature.

## 2024-07-25 NOTE — TELEPHONE ENCOUNTER
Received back from provider paperwork for non-emergency medivan transport. Attempted to fax back to [phone]. Fax has not gone through. Will keep trying until successful.

## 2024-07-26 NOTE — TELEPHONE ENCOUNTER
Attempted to fax to [phone]. This has failed x 6.     Copy can be mailed to [place] [address] [place] [address]    Unsure what [facility] is since it is located in [place] and the forms are for [place]. Will need more clarification before sending back.

## 2024-07-29 NOTE — TELEPHONE ENCOUNTER
RN GREER 38.41    Called number on paperwork and was transferred mulitple times, call connected multiple times.    After multiple attempts, given phone number number for [facility] as [phone].    Spoke with rep at [facility] and fax number given as [phone].    Fax confirmed, copy sent to scanning and copy in RN file in case in needs to be re-faxed.

## 2024-08-01 ENCOUNTER — TELEPHONE (OUTPATIENT)
Dept: NEUROLOGY | Facility: CLINIC | Age: 49
End: 2024-08-01

## 2024-08-01 DIAGNOSIS — G35 MS (MULTIPLE SCLEROSIS) (HCC): Primary | ICD-10-CM

## 2024-08-01 RX ORDER — DIPHENHYDRAMINE HYDROCHLORIDE 50 MG/ML
50 INJECTION INTRAMUSCULAR; INTRAVENOUS ONCE
Status: CANCELLED | OUTPATIENT
Start: 2024-08-02

## 2024-08-01 RX ORDER — METHYLPREDNISOLONE SODIUM SUCCINATE 125 MG/2ML
100 INJECTION, POWDER, LYOPHILIZED, FOR SOLUTION INTRAMUSCULAR; INTRAVENOUS ONCE
Status: CANCELLED | OUTPATIENT
Start: 2024-08-02

## 2024-08-01 RX ORDER — ACETAMINOPHEN 325 MG/1
650 TABLET ORAL ONCE
Status: CANCELLED | OUTPATIENT
Start: 2024-08-02

## 2024-08-01 NOTE — TELEPHONE ENCOUNTER
Received message from [facility] and in-patient pharmacy for new orders for OCREVUS.    Need to verify PA status.  Per pharmacist, orders need to state patient supplied and note specialty pharmacy information.

## 2024-08-01 NOTE — TELEPHONE ENCOUNTER
Per EPIC review, PA approved through 8/5/2024.  Pt's next infusion is 8/2/2024.  Will need new PA for future visits.

## 2024-08-01 NOTE — TELEPHONE ENCOUNTER
Orders placed as pt supplied.  Pt gets medication buy and bill from [facility] - phone [phone] .    Will need new PA next week for next year.     Pended to come back next week to complete new PA.

## 2024-08-02 ENCOUNTER — OFFICE VISIT (OUTPATIENT)
Dept: HEMATOLOGY/ONCOLOGY | Facility: HOSPITAL | Age: 49
End: 2024-08-02
Attending: Other
Payer: MEDICAID

## 2024-08-02 VITALS
RESPIRATION RATE: 16 BRPM | SYSTOLIC BLOOD PRESSURE: 138 MMHG | HEART RATE: 64 BPM | BODY MASS INDEX: 28 KG/M2 | OXYGEN SATURATION: 98 % | WEIGHT: 174.81 LBS | TEMPERATURE: 98 F | DIASTOLIC BLOOD PRESSURE: 69 MMHG

## 2024-08-02 DIAGNOSIS — G35 MS (MULTIPLE SCLEROSIS) (HCC): Primary | ICD-10-CM

## 2024-08-02 PROCEDURE — 96365 THER/PROPH/DIAG IV INF INIT: CPT

## 2024-08-02 PROCEDURE — 96375 TX/PRO/DX INJ NEW DRUG ADDON: CPT

## 2024-08-02 PROCEDURE — 96366 THER/PROPH/DIAG IV INF ADDON: CPT

## 2024-08-02 RX ORDER — METHYLPREDNISOLONE SODIUM SUCCINATE 125 MG/2ML
100 INJECTION, POWDER, LYOPHILIZED, FOR SOLUTION INTRAMUSCULAR; INTRAVENOUS ONCE
Status: COMPLETED | OUTPATIENT
Start: 2024-08-02 | End: 2024-08-02

## 2024-08-02 RX ORDER — DIPHENHYDRAMINE HYDROCHLORIDE 50 MG/ML
50 INJECTION INTRAMUSCULAR; INTRAVENOUS ONCE
Status: CANCELLED | OUTPATIENT
Start: 2024-08-02

## 2024-08-02 RX ORDER — ACETAMINOPHEN 325 MG/1
650 TABLET ORAL ONCE
Status: COMPLETED | OUTPATIENT
Start: 2024-08-02 | End: 2024-08-02

## 2024-08-02 RX ORDER — METHYLPREDNISOLONE SODIUM SUCCINATE 125 MG/2ML
100 INJECTION, POWDER, LYOPHILIZED, FOR SOLUTION INTRAMUSCULAR; INTRAVENOUS ONCE
Status: CANCELLED | OUTPATIENT
Start: 2024-08-02

## 2024-08-02 RX ORDER — ACETAMINOPHEN 325 MG/1
650 TABLET ORAL ONCE
Status: CANCELLED | OUTPATIENT
Start: 2024-08-02

## 2024-08-02 RX ORDER — DIPHENHYDRAMINE HYDROCHLORIDE 50 MG/ML
50 INJECTION INTRAMUSCULAR; INTRAVENOUS ONCE
Status: COMPLETED | OUTPATIENT
Start: 2024-08-02 | End: 2024-08-02

## 2024-08-02 RX ADMIN — DIPHENHYDRAMINE HYDROCHLORIDE 50 MG: 50 INJECTION INTRAMUSCULAR; INTRAVENOUS at 09:55:00

## 2024-08-02 RX ADMIN — ACETAMINOPHEN 650 MG: 325 TABLET ORAL at 09:53:00

## 2024-08-02 RX ADMIN — METHYLPREDNISOLONE SODIUM SUCCINATE 100 MG: 125 INJECTION, POWDER, LYOPHILIZED, FOR SOLUTION INTRAMUSCULAR; INTRAVENOUS at 09:54:00

## 2024-08-02 NOTE — PROGRESS NOTES
Pt here for Ocrevus . Pt denies any issues or concerns.      Ordering Provider: MD Sven  Order Exp: one time dose     Pt tolerated infusion without difficulty or complaint. Reviewed next apt date/time: Await new orders from Neurology office to schedule next visit. Approx 6 months interval for next infusion.       Education Record  Learner:  Patient  Disease / Diagnosis: MS  Barriers / Limitations:  None  Method:  Discussion  General Topics:  Plan of care reviewed  Outcome:  Shows understanding

## 2024-08-12 DIAGNOSIS — G35 MS (MULTIPLE SCLEROSIS) (HCC): ICD-10-CM

## 2024-08-12 RX ORDER — OCRELIZUMAB 300 MG/10ML
600 INJECTION INTRAVENOUS
Qty: 20 ML | Refills: 0 | Status: SHIPPED | OUTPATIENT
Start: 2024-08-12

## 2024-08-12 NOTE — TELEPHONE ENCOUNTER
Medication: Ocrevus 300mg/10ml sdv     Date of last refill: 2/8/24 (#20ml/0)  [place]      Last office visit: 5/1/24  Due back to clinic per last office note:  6m  Date next office visit scheduled:    Future Appointments   Date Time Provider Department Center   11/1/2024  1:10 PM Melissa Machuca MD [facility]           Last OV note recommendation:    (G35) MS (multiple sclerosis) (HCC)  (primary encounter diagnosis)  Plan: MRI BRAIN (W+WO) (CPT=70553), MRI         CERVICAL+THORACIC SPINE (ALL W+WO)         (CPT=72156/22438), Ophthalmology Referral - In         Network           (R20.0,  R20.2) Numbness and tingling of both upper extremities        (R26.9) Gait abnormality        (H54.7) Poor vision  Plan: Ophthalmology Referral - In Network        Surveillance MRI brain and MRI cervical and thoracic spine ( due Dec 2023)     2. Continue Ocrevus infusion every 6 months, last one in August 2023     3.  Again suggested to see Neuro- Ophthalmology- Dr Hutchison, referral placed     4.  Increase dose of Duloxetine slowly to 60 mg BID for both mood disorder and neuropathic pain   Continue Gabapentin 300 mg TID.     5.  Need to follow with behavioral health [facility]. Would benefit from individual psychotherapy     6.  Completed home PT/OT.  Has a  to assist.  Encourage using walker at home and fall precautions

## 2024-08-27 ENCOUNTER — TELEPHONE (OUTPATIENT)
Dept: NEUROLOGY | Facility: CLINIC | Age: 49
End: 2024-08-27

## 2024-11-01 ENCOUNTER — OFFICE VISIT (OUTPATIENT)
Dept: NEUROLOGY | Facility: CLINIC | Age: 49
End: 2024-11-01
Payer: MEDICAID

## 2024-11-01 VITALS
OXYGEN SATURATION: 98 % | WEIGHT: 170 LBS | BODY MASS INDEX: 27 KG/M2 | DIASTOLIC BLOOD PRESSURE: 70 MMHG | SYSTOLIC BLOOD PRESSURE: 124 MMHG | HEART RATE: 106 BPM

## 2024-11-01 DIAGNOSIS — G35 MS (MULTIPLE SCLEROSIS) (HCC): Primary | ICD-10-CM

## 2024-11-01 DIAGNOSIS — R26.9 GAIT ABNORMALITY: ICD-10-CM

## 2024-11-01 DIAGNOSIS — Z86.69 HISTORY OF OPTIC NEURITIS: ICD-10-CM

## 2024-11-01 PROCEDURE — 99214 OFFICE O/P EST MOD 30 MIN: CPT | Performed by: OTHER

## 2024-11-01 RX ORDER — OLANZAPINE 2.5 MG/1
2.5 TABLET, FILM COATED ORAL 2 TIMES DAILY
COMMUNITY

## 2024-11-01 RX ORDER — ERGOCALCIFEROL 1.25 MG/1
50000 CAPSULE, LIQUID FILLED ORAL WEEKLY
COMMUNITY

## 2024-11-01 NOTE — PROGRESS NOTES
HPI:    Patient ID: Trav Isaac is a 49 year old male.    Neurologic Problem  Associated symptoms include fatigue. Pertinent negatives include no headaches.   Multiple Sclerosis  Associated symptoms include fatigue and numbness. Pertinent negatives include no headaches.     Trav Isaac is a 49 year old male who presents for follow up for Multiple Sclerosis   Patient was last seen in May 2024.  He is on Ocrevus infusion and had infusion done in 2024.  Patient still has not completed the surveillance MRI brain and cervical spine and the order has .    He is here with CNA from [facility],   States he had an domestic dispute again with his mom and then she called him for his.  He was jailed 15 days, got release and sent to NH and still in the nursing facility.  He is now on Olanzapine 2.5 mg BID. Denies any active mood and behavior issues.  States he would like to have get out of the nursing facility and rent an apartment      Office visit: May 2024    Patient is a 48-year-old male who presented for follow-up for Multiple sclerosis.    Doing well and denies any interval new neurological symptoms  C/o fatigue, continues to have poor vision in both eyes and gait abnormality.  He didn't saw Neuro-ophthalmology as recommended    States his mother sold the home and now he is living in a hotel until he finds a new place.  He didn't completed a surveillance MRI brain and MRI spine.  On Ocrevus infusion and last infusion was in 2024 tolerating it fine      Office visit: 2023      Trav Isaac is a 48 year old male who presents for follow up for Multiple sclerosis.States this morning at the  at our office fell while getting out of wheel chair, tripped over foot rest pad , fell on right side of face. States mild pain in the right forehead. Denies any facial pain or eye pain.  Has baseline face numbness and numbness in arms and legs and c/o diffuse aches and pain.  States he does not feel good but unable to specify. He denies any new focal neurological deficits. He is on Ocrevus infusion for MS last infusion was in August 2023.    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  He states he continues to have issues with his mother and feels frustrated with entire situation and conflict with his mother. He was diagnosed with adjustment disorder with depression/anxiety since the diagnosis of MS on Duloxetine both for mood and neuropathic pain., not seeing a therapist as recommended    Of note, per care everywhere was taken to [facility] after verbal altercation with his mother for wellness check. He was admitted in August for possible infection but work up was unremarkable.         Initial history   Trav Isaac is a a(n) 47 year old male with newly diagnosed Multiple sclerosis, recent admission for exacerbation directly admitted for new onset right eye vision loss.  Patient was just admitted 2 weeks ago at [facility] with increasing weakness, fall and vision loss in the left eye. MRI brain and MRI thoracic spine obtained showed small new lesions in right frontal and left parietal lobe and thoracic spinal cord T5.  He was treated with IV Solu-Medrol infusion and was transition to oral steroids.  He states his vision in the left eye never improved and is almost blind in the left eye.  He states that the vision in the right eye is getting dark.  Patient was unable to get subacute rehab due to insurance issues.  Patient is not doing outpatient physical therapy.  He is frustrated with his overall health condition and has strained relationship with his mother. Saw Dr Cespedes Psychiatrist in hospital and diagnosed with Adjustment disorder with anxiety and depression and individual psychotherapy is recommended. On Gabapentin 300 mg  TID          HISTORY:  Past Medical History:    Asthma (HCC)    Cancer (HCC)    Colon cancer (HCC)    Stage 1    4 years ago.    Hernia    Hernias x 3 in the abdomen    Multiple sclerosis (HCC)    Prostate CA (HCC)     FATHER     S/P colon resection      Past Surgical History:   Procedure Laterality Date    Colon surgery      Knee arthroscopy Left     Other      Hand surgery    Other      Cyst rupture in abdomen, surgical repair    Other      abdominal mesh revision     Other surgical history      adominal mesh    Repair ing hernia,7+y/o,reducibl        History reviewed. No pertinent family history.   Social History     Socioeconomic History    Marital status: Single   Tobacco Use    Smoking status: Some Days     Current packs/day: 0.50     Types: Cigarettes    Smokeless tobacco: Never   Vaping Use    Vaping status: Never Used   Substance and Sexual Activity    Alcohol use: Not Currently    Drug use: Not Currently     Types: Cannabis   Other Topics Concern    Caffeine Concern Yes     Comment: pop 2-3 daily    Exercise No     Comment: walking and stretching     Social Drivers of Health     Food Insecurity: Food Insecurity Present (8/8/2023)    Received from [facility], [facility]    Food Insecurity     Currently or in the past 3 months, have you worried your food would run out before you had money to buy more?: Yes     In the past 12 months, have you run out of food or been unable to get more?: Yes   Transportation Needs: No Transportation Needs (8/8/2023)    Received from [facility], [facility]    Transportation Needs     Medical Transportation Needs?: No    Received from [facility], [facility]    Social Connections        Review of Systems   Constitutional:  Positive for fatigue.   HENT: Negative.     Eyes:  Positive for visual disturbance. Negative for pain.   Respiratory: Negative.      Cardiovascular: Negative.    Gastrointestinal: Negative.    Endocrine: Negative.    Genitourinary: Negative.    Musculoskeletal:  Positive for gait problem.   Skin: Negative.    Allergic/Immunologic: Negative.    Neurological:  Positive for numbness. Negative for headaches.   Hematological: Negative.    Psychiatric/Behavioral: Negative.     All other systems reviewed and are negative.           Current Outpatient Medications   Medication Sig Dispense Refill    ergocalciferol 1.25 MG (40854 UT) Oral Cap Take 1 capsule (50,000 Units total) by mouth once a week.      Ocrelizumab (OCREVUS) 300 MG/10ML Intravenous Solution Inject 20 mL (600 mg total) into the vein every 6 (six) months. 20 mL 0    gabapentin 300 MG Oral Cap Take 1 capsule (300 mg total) by mouth in the morning, at noon, and at bedtime. 90 capsule 5    DULoxetine 60 MG Oral Cap DR Particles Take 1 capsule (60 mg total) by mouth 2 (two) times daily. 60 capsule 5    diphenhydrAMINE-zinc 2-0.1 % External Cream Apply to BUE for rash until resolved      Multiple Vitamin (QUINTABS) Oral Tab Take 1 tablet by mouth daily.       Allergies:  Allergies   Allergen Reactions    Amoxicillin HIVES    Mushroom Extract Complex HIVES    Molds & Smuts RASH    Mushrooms HIVES    Penicillins RASH    Radiology Contrast Iodinated Dyes RASH     Possible hives, no anaphylaxis     PHYSICAL EXAM:   Physical Exam  Blood pressure 124/70, pulse 106, weight 170 lb (77.1 kg), SpO2 98%.        General Appearance: Well nourished, well developed, no apparent distress.  HEENT: Normocephalic and atraumatic.   Cardiovascular: Normal rate, regular rhythm and normal heart sounds.    Pulmonary/Chest: Effort normal and breath sounds normal.   Abdominal: Soft. Bowel sounds are normal.   Skin: dry, clean and intact  Ext: peripheral pulses present  Psych: normal mood and affect    Neurological:  Patient is awake, alert and oriented to person, place and time   Normal attention/concentration, speech  and language.    Cranial Nerves:   II: Visual acuity: Poor vision in both eyes-  right 20/80, left 20/200 can see hand movements  III: Pupils: equal, round, reactive to light  III,IV,VI: Extra Ocular Movements: intact  V: Facial sensation: intact  VII: Facial strength: intact  VIII: Hearing: intact  IX: Palate: intact  XI: Shoulder shrug: intact  XII: Tongue movement: normal    Motor: Normal tone.  Strength is about 5/5 in bilateral UE  and and 4/5 bilaterally LE     Sensory: Patchy decreased sensation in all limbs    Coordination: Finger-to-nose test normal bilaterally without evidence of dysmetria.    Gait: deferred       According to the Expanded Disability Status Scale (EDSS) method of quantifying disability in patients suffering from multiple sclerosis, the current level for the patient in question is 7.0.    This is characterized in the scale by the following: Unable to walk beyond approximately 5 m even with aid; essentially restricted to wheelchair; wheels self in standard wheelchair and transfers alone; up and about approximately 12 hr/day (usual FS equivalents are combinations with more than 1 FS grade 4+; very rarely, pyramidal grade 5 alone).       TESTS/IMAGING:       MRI brain w and wo contrast: Dec 2022        Impression   CONCLUSION:       1.  There is moderate burden of demyelinating plaques within the supratentorial and infratentorial brain without active or new demyelinating plaque at this time.     2.  There is mild increased T2 signal and possible mild enhancement within the right optic nerve may be due to mild optic neuritis.            ASSESSMENT/PLAN:       ICD-10-CM    1. MS (multiple sclerosis) ([facility])  G35 MRI BRAIN (W+WO) (CPT=70553)     MRI SPINE CERVICAL (W+WO) (CPT=72156)      2. History of optic neuritis  Z86.69       3. Gait abnormality  R26.9                 Patient is a 48-year-old male who presented for follow-up for multiple sclerosis.    Overall he is stable MS wise.        Complete Surveillance MRI brain and MRI cervical and thoracic spine ( order valid till Nov 2024)    2. Continue Ocrevus infusion every 6 months, last one in Aug 2024  Annual CBC and CMP       4.  Continue Duloxetine slowly to 60 mg BID for both mood disorder and neuropathic pain   Continue Gabapentin 300 mg TID.    5.  Follow up with behavioral health and will defer to them regarding patient's question if he is clear to live independently in an apartment. Has a SW/ and advise to discuss with SW.  Patient got upset with my response, explain to him that due to mood disorder and psychosis it is important to discuss the above with behavioral team      Follow-up in about 6 months    Melissa Machuca MD  [facility] [place]        Meds This Visit:  Requested Prescriptions      No prescriptions requested or ordered in this encounter       Imaging & Referrals:  None     ID#[id number]

## 2024-11-01 NOTE — PATIENT INSTRUCTIONS
Refill policies:    Allow 2-3 business days for refills; controlled substances may take longer.  Contact your pharmacy at least 5 days prior to running out of medication and have them send an electronic request or submit request through the “request refill” option in your [employer] account.  Refills are not addressed on weekends; covering physicians do not authorize routine medications on weekends.  No narcotics or controlled substances are refilled after noon on Fridays or by on call physicians.  By law, narcotics must be electronically prescribed.  A 30 day supply with no refills is the maximum allowed.  If your prescription is due for a refill, you may be due for a follow up appointment.  To best provide you care, patients receiving routine medications need to be seen at least once a year.  Patients receiving narcotic/controlled substance medications need to be seen at least once every 3 months.  In the event that your preferred pharmacy does not have the requested medication in stock (e.g. Backordered), it is your responsibility to find another pharmacy that has the requested medication available.  We will gladly send a new prescription to that pharmacy at your request.    Scheduling Tests:    If your physician has ordered radiology tests such as MRI or CT scans, please contact Central Scheduling at [phone] right away to schedule the test.  Once scheduled, the [facility] Centralized Referral Team will work with your insurance carrier to obtain pre-certification or prior authorization.  Depending on your insurance carrier, approval may take 3-10 days.  It is highly recommended patients assure they have received an authorization before having a test performed.  If test is done without insurance authorization, patient may be responsible for the entire amount billed.      Precertification and Prior Authorizations:  If your physician has recommended that you have a procedure or additional testing performed the [facility]  Centralized Referral Team will contact your insurance carrier to obtain pre-certification or prior authorization.    You are strongly encouraged to contact your insurance carrier to verify that your procedure/test has been approved and is a COVERED benefit.  Although the [facility] Centralized Referral Team does its due diligence, the insurance carrier gives the disclaimer that \"Although the procedure is authorized, this does not guarantee payment.\"    Ultimately the patient is responsible for payment.   Thank you for your understanding in this matter.  Paperwork Completion:  If you require FMLA or disability paperwork for your recovery, please make sure to either drop it off or have it faxed to our office at [phone]. Be sure the form has your name and date of birth on it.  The form will be faxed to our Forms Department and they will complete it for you.  There is a 25$ fee for all forms that need to be filled out.  Please be aware there is a 10-14 day turnaround time.  You will need to sign a release of information (SONU) form if your paperwork does not come with one.  You may call the Forms Department with any questions at [phone].  Their fax number is [phone].

## 2024-12-27 ENCOUNTER — TELEPHONE (OUTPATIENT)
Dept: NEUROLOGY | Facility: CLINIC | Age: 49
End: 2024-12-27

## 2024-12-27 NOTE — TELEPHONE ENCOUNTER
Received fax from [employer] stating prior authorization is required for Ocrevus.  Per Epic review, current Ocrevus authorization expires on 1/20/2025.  Payer's preferred method of PA submission is verbal. Standard turn around time is 15 days. To initiate verbally, please call the prior authorization department at [phone].  Billing code , Admin. Code: 11962/08838, Dx G35.  Subscriber ID: [id number], Gp # : [id number].  Patient gets infusions at [facility]. NPI [id number], Dept ID [id number].

## 2025-01-06 ENCOUNTER — TELEPHONE (OUTPATIENT)
Age: 50
End: 2025-01-06

## 2025-01-13 ENCOUNTER — TELEPHONE (OUTPATIENT)
Age: 50
End: 2025-01-13

## 2025-01-24 DIAGNOSIS — G35 MS (MULTIPLE SCLEROSIS) (HCC): ICD-10-CM

## 2025-01-24 RX ORDER — OCRELIZUMAB 300 MG/10ML
600 INJECTION INTRAVENOUS
Qty: 20 ML | Refills: 0 | Status: SHIPPED | OUTPATIENT
Start: 2025-01-24

## 2025-01-27 NOTE — TELEPHONE ENCOUNTER
Insurance notified [facility] they need PA for Ocrevus. Please advise, [facility]'s best cb # [phone]. Endorsed to [facility].

## 2025-01-29 ENCOUNTER — OFFICE VISIT (OUTPATIENT)
Age: 50
End: 2025-01-29
Attending: Other
Payer: MEDICAID

## 2025-01-29 DIAGNOSIS — G35 MS (MULTIPLE SCLEROSIS) (HCC): Primary | ICD-10-CM

## 2025-01-29 RX ORDER — DIPHENHYDRAMINE HYDROCHLORIDE 50 MG/ML
50 INJECTION INTRAMUSCULAR; INTRAVENOUS ONCE
Status: COMPLETED | OUTPATIENT
Start: 2025-01-29 | End: 2025-01-29

## 2025-01-29 RX ORDER — METHYLPREDNISOLONE SODIUM SUCCINATE 125 MG/2ML
100 INJECTION INTRAMUSCULAR; INTRAVENOUS ONCE
Status: COMPLETED | OUTPATIENT
Start: 2025-01-29 | End: 2025-01-29

## 2025-01-29 RX ORDER — METHYLPREDNISOLONE SODIUM SUCCINATE 125 MG/2ML
100 INJECTION INTRAMUSCULAR; INTRAVENOUS ONCE
Status: CANCELLED | OUTPATIENT
Start: 2025-01-29

## 2025-01-29 RX ORDER — DIPHENHYDRAMINE HYDROCHLORIDE 50 MG/ML
50 INJECTION INTRAMUSCULAR; INTRAVENOUS ONCE
Status: CANCELLED | OUTPATIENT
Start: 2025-01-29

## 2025-01-29 RX ADMIN — DIPHENHYDRAMINE HYDROCHLORIDE 50 MG: 50 INJECTION INTRAMUSCULAR; INTRAVENOUS at 11:10:00

## 2025-01-29 RX ADMIN — METHYLPREDNISOLONE SODIUM SUCCINATE 100 MG: 125 INJECTION INTRAMUSCULAR; INTRAVENOUS at 11:10:00

## 2025-01-29 NOTE — PROGRESS NOTES
Pt here for Ocrevus infusion . Pt denies any issues or concerns.      Ordering Provider: Dr. Melissa Wright  Order Exp: N/A - 1x order     Pt tolerated infusion without difficulty or complaint. Reviewed next apt date/time: N/A - 1x order      Education Record  Learner:  Patient  Disease / Diagnosis:  multiple sclerosis  Barriers / Limitations:  None  Method:  Discussion  General Topics:  Medication, Plan of care reviewed, and Fall risk and prevention  Outcome:  Shows understanding     Pt discharged in stable condition via wheelchair with caregiver.

## 2025-02-10 ENCOUNTER — TELEPHONE (OUTPATIENT)
Dept: NEUROLOGY | Facility: CLINIC | Age: 50
End: 2025-02-10

## 2025-02-11 NOTE — TELEPHONE ENCOUNTER
Late entry: Call was not successfully transferred to RN yesterday and no return call as of right now.

## 2025-02-14 NOTE — TELEPHONE ENCOUNTER
Kori, Director of Nursing at [facility], is calling to schedule patient's next injection. Kori states patient receives the injection every 6 months, and she would like to schedule since they have to arrange patient transportation. Please contact Kori.

## 2025-03-28 ENCOUNTER — TELEPHONE (OUTPATIENT)
Dept: NEUROLOGY | Facility: CLINIC | Age: 50
End: 2025-03-28

## 2025-03-28 NOTE — TELEPHONE ENCOUNTER
Received fax from [facility] w/ requesting date of last Ocrevus infusion and date of next infusion. Dates requested faxed to Lamsa with fax confirmation received.

## 2025-06-24 NOTE — PROGRESS NOTES
HPI:    Patient ID: Trav Isaac is a 50 year old male.    Multiple Sclerosis  Associated symptoms include fatigue and numbness. Pertinent negatives include no headaches.   Neurologic Problem  Associated symptoms include fatigue. Pertinent negatives include no headaches.       Patient is a 50-year-old male with history of Multiple sclerosis who presented for follow-up.States since last seen he has been relatively stable from an MS perspective, denies any new neurological symptoms. He resides at NH. States he is due for Ocrevus infusion, last one was in 2025  MRI brain and cervical spine still pending  He is asking for Marijuana card and also requesting to refills his Pain medications        Office visit: 2024   Trav Isaac is a 49 year old male who presents for follow up for Multiple Sclerosis   Patient was last seen in May 2024.  He is on Ocrevus infusion and had infusion done in 2024.  Patient still has not completed the surveillance MRI brain and cervical spine and the order has .    He is here with CNA from [facility],   States he had an domestic dispute again with his mom and then she called him for his.  He was jailed 15 days, got release and sent to NH and still in the nursing facility.  He is now on Olanzapine 2.5 mg BID. Denies any active mood and behavior issues.  States he would like to have get out of the nursing facility and rent an apartment      Office visit: May 2024    Patient is a 48-year-old male who presented for follow-up for Multiple sclerosis.    Doing well and denies any interval new neurological symptoms  C/o fatigue, continues to have poor vision in both eyes and gait abnormality.  He didn't saw Neuro-ophthalmology as recommended    States his mother sold the home and now he is living in a hotel until he finds a new place.  He didn't completed a surveillance MRI brain and MRI spine.  On Ocrevus infusion and last infusion was in Feb  2024 tolerating it fine      Office visit: Nov 2023      Trav Isaac is a 48 year old male who presents for follow up for Multiple sclerosis.States this morning at the  at our office fell while getting out of wheel chair, tripped over foot rest pad , fell on right side of face. States mild pain in the right forehead. Denies any facial pain or eye pain.  Has baseline face numbness and numbness in arms and legs and c/o diffuse aches and pain. States he does not feel good but unable to specify. He denies any new focal neurological deficits. He is on Ocrevus infusion for MS last infusion was in August 2023.    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  He states he continues to have issues with his mother and feels frustrated with entire situation and conflict with his mother. He was diagnosed with adjustment disorder with depression/anxiety since the diagnosis of MS on Duloxetine both for mood and neuropathic pain., not seeing a therapist as recommended    Of note, per care everywhere was taken to [facility] immediate care after verbal altercation with his mother for wellness check. He was admitted in August for possible infection but work up was unremarkable.         Initial history   Trav Isaac is a a(n) 47 year old male with newly diagnosed Multiple sclerosis, recent admission for exacerbation directly admitted for new onset right eye vision loss.  Patient was just admitted 2 weeks ago at [facility] with increasing weakness, fall and vision loss in the left eye. MRI brain and MRI thoracic spine obtained showed small new lesions in right frontal and left parietal lobe and thoracic spinal cord T5.  He was treated with IV Solu-Medrol infusion and was transition to oral steroids.  He states his vision in the left eye never improved and is almost blind in the left eye.   He states that the vision in the right eye is getting dark.  Patient was unable to get subacute rehab due to insurance issues.  Patient is not doing outpatient physical therapy.  He is frustrated with his overall health condition and has strained relationship with his mother. Saw Dr Cespedes Psychiatrist in hospital and diagnosed with Adjustment disorder with anxiety and depression and individual psychotherapy is recommended. On Gabapentin 300 mg TID          HISTORY:  Past Medical History:    Asthma (HCC)    Cancer (HCC)    Colon cancer (HCC)    Stage 1    4 years ago.    Glucagonoma    Hernia    Hernias x 3 in the abdomen    Multiple sclerosis (HCC)    Prostate CA (HCC)     FATHER     S/P colon resection      Past Surgical History:   Procedure Laterality Date    Colon surgery      Knee arthroscopy Left     Other      Hand surgery    Other      Cyst rupture in abdomen, surgical repair    Other      abdominal mesh revision     Other surgical history      adominal mesh    Repair ing hernia,7+y/o,reducibl        No family history on file.   Social History     Socioeconomic History    Marital status: Single   Tobacco Use    Smoking status: Some Days     Current packs/day: 0.50     Types: Cigarettes    Smokeless tobacco: Never   Vaping Use    Vaping status: Never Used   Substance and Sexual Activity    Alcohol use: Not Currently    Drug use: Not Currently     Types: Cannabis   Other Topics Concern    Caffeine Concern Yes     Comment: pop 2-3 daily    Exercise No     Comment: walking and stretching     Social Drivers of Health     Food Insecurity: High Risk (11/4/2024)    Received from [facility]    Food Insecurity     Have there been times that your food ran out, and you didn't have money to get more?: Yes   Transportation Needs: Low Risk  (11/4/2024)    Received from [facility]    Transportation Needs     Do you have trouble getting transportation to medical appointments?: No    Housing Stability: Low Risk  (11/4/2024)    Received from [facility]    Housing Stability     Are you worried that your electric, gas, oil, or water might be shut off?: No     Are you concerned about having a safe and reliable place to live?: Yes   Recent Concern: Housing Stability - High Risk (11/4/2024)    Received from [facility]    Housing Stability     Are you concerned about having a safe and reliable place to live?: Yes        Review of Systems   Constitutional:  Positive for fatigue.   HENT: Negative.     Eyes:  Positive for visual disturbance. Negative for pain.   Respiratory: Negative.     Cardiovascular: Negative.    Gastrointestinal: Negative.    Endocrine: Negative.    Genitourinary: Negative.    Musculoskeletal:  Positive for gait problem.   Skin: Negative.    Allergic/Immunologic: Negative.    Neurological:  Positive for numbness. Negative for headaches.   Hematological: Negative.    Psychiatric/Behavioral: Negative.     All other systems reviewed and are negative.           Current Outpatient Medications   Medication Sig Dispense Refill    Ocrelizumab (OCREVUS) 300 MG/10ML Intravenous Solution Inject 20 mL (600 mg total) into the vein every 6 (six) months. 20 mL 0    ergocalciferol 1.25 MG (62041 UT) Oral Cap Take 1 capsule (50,000 Units total) by mouth once a week.      OLANZapine 2.5 MG Oral Tab Take 1 tablet (2.5 mg total) by mouth in the morning and 1 tablet (2.5 mg total) before bedtime.      gabapentin 300 MG Oral Cap Take 1 capsule (300 mg total) by mouth in the morning, at noon, and at bedtime. 90 capsule 5    DULoxetine 60 MG Oral Cap DR Particles Take 1 capsule (60 mg total) by mouth 2 (two) times daily. 60 capsule 5    diphenhydrAMINE-zinc 2-0.1 % External Cream Apply to BUE for rash until resolved      Multiple Vitamin (QUINTABS) Oral Tab Take 1 tablet by mouth daily.       Allergies:  Allergies   Allergen Reactions    Amoxicillin HIVES     Mushroom Extract Complex HIVES    Molds & Smuts RASH    Mushrooms HIVES    Penicillins RASH    Radiology Contrast Iodinated Dyes RASH     Possible hives, no anaphylaxis     PHYSICAL EXAM:   Physical Exam  Blood pressure 107/72, pulse 80, resp. rate 16, height 66\", weight 220 lb (99.8 kg), SpO2 97%.        General Appearance: Well nourished, well developed, no apparent distress.  HEENT: Normocephalic and atraumatic.   Cardiovascular: Normal rate, regular rhythm and normal heart sounds.    Pulmonary/Chest: Effort normal and breath sounds normal.   Abdominal: Soft. Bowel sounds are normal.   Skin: dry, clean and intact  Ext: peripheral pulses present  Psych: normal mood and affect    Neurological:  Patient is awake, alert and oriented to person, place and time   Normal attention/concentration, speech and language.    Cranial Nerves:   II: Visual acuity: Poor vision in both eyes-  right 20/80, left 20/200 can see hand movements  III: Pupils: equal, round, reactive to light  III,IV,VI: Extra Ocular Movements: intact  V: Facial sensation: intact  VII: Facial strength: intact  VIII: Hearing: intact  IX: Palate: intact  XI: Shoulder shrug: intact  XII: Tongue movement: normal    Motor: Normal tone.  Strength is about 5/5 in bilateral UE  and and 4/5 bilaterally LE     Sensory: Patchy decreased sensation in all limbs    Coordination: Finger-to-nose test normal bilaterally without evidence of dysmetria.    Gait: deferred       According to the Expanded Disability Status Scale (EDSS) method of quantifying disability in patients suffering from multiple sclerosis, the current level for the patient in question is 7.0.    This is characterized in the scale by the following: Unable to walk beyond approximately 5 m even with aid; essentially restricted to wheelchair; wheels self in standard wheelchair and transfers alone; up and about approximately 12 hr/day (usual FS equivalents are combinations with more than 1 FS grade 4+; very  rarely, pyramidal grade 5 alone).       TESTS/IMAGING:       MRI brain w and wo contrast: Dec 2022        Impression   CONCLUSION:       1.  There is moderate burden of demyelinating plaques within the supratentorial and infratentorial brain without active or new demyelinating plaque at this time.     2.  There is mild increased T2 signal and possible mild enhancement within the right optic nerve may be due to mild optic neuritis.            ASSESSMENT/PLAN:       ICD-10-CM    1. MS (multiple sclerosis) (HCC)  G35 Hepatitis B Surface Antigen [E]     Immunoglobulin A/G/M, Quant [E]     T AND B cell lymphocyte panel [E]     Ocrelizumab (OCREVUS) 300 MG/10ML Intravenous Solution     CANCELED: QUANTIFERON TB GOLD (IN TUBE) [41721] [Q]                Patient is a 48-year-old male who presented for follow-up for Multiple sclerosis.   Overall he is stable MS wise, on Ocrevus infusion      Complete Surveillance MRI brain and MRI cervical and thoracic spine ( order valid till Nov 2025)    2. Continue Ocrevus infusion every 6 months ( last in Jan 2025)  Check labs: Ig quantitative panel, T and B lymphocyte panel, hep B surface antigen      4.  Continue Duloxetine slowly to 60 mg BID for both mood disorder and neuropathic pain   Continue Gabapentin 300 mg TID.    5.  Follow up with behavioral health for mood disorder    Discuss with the patient that we don't give marijuana card or fills those forms. He needs to go to a medical Marijuana clinic    Follow-up in about 6 months    Melissa Machuca MD  [facility] [place]        [facility] This Visit:  Requested Prescriptions      No prescriptions requested or ordered in this encounter       Imaging & Referrals:  None     ID#[id number]

## 2025-06-24 NOTE — PATIENT INSTRUCTIONS
Refill policies:    Allow 2-3 business days for refills; controlled substances may take longer.  Contact your pharmacy at least 5 days prior to running out of medication and have them send an electronic request or submit request through the “request refill” option in your [facility] account.  Refills are not addressed on weekends; covering physicians do not authorize routine medications on weekends.  No narcotics or controlled substances are refilled after noon on Fridays or by on call physicians.  By law, narcotics must be electronically prescribed.  A 30 day supply with no refills is the maximum allowed.  If your prescription is due for a refill, you may be due for a follow up appointment.  To best provide you care, patients receiving routine medications need to be seen at least once a year.  Patients receiving narcotic/controlled substance medications need to be seen at least once every 3 months.  In the event that your preferred pharmacy does not have the requested medication in stock (e.g. Backordered), it is your responsibility to find another pharmacy that has the requested medication available.  We will gladly send a new prescription to that pharmacy at your request.    Scheduling Tests:    If your physician has ordered radiology tests such as MRI or CT scans, please contact Central Scheduling at [phone] right away to schedule the test.  Once scheduled, the [facility] Centralized Referral Team will work with your insurance carrier to obtain pre-certification or prior authorization.  Depending on your insurance carrier, approval may take 3-10 days.  It is highly recommended patients assure they have received an authorization before having a test performed.  If test is done without insurance authorization, patient may be responsible for the entire amount billed.      Precertification and Prior Authorizations:  If your physician has recommended that you have a procedure or additional testing performed the [facility]  Centralized Referral Team will contact your insurance carrier to obtain pre-certification or prior authorization.    You are strongly encouraged to contact your insurance carrier to verify that your procedure/test has been approved and is a COVERED benefit.  Although the [facility] Centralized Referral Team does its due diligence, the insurance carrier gives the disclaimer that \"Although the procedure is authorized, this does not guarantee payment.\"    Ultimately the patient is responsible for payment.   Thank you for your understanding in this matter.  Paperwork Completion:  If you require FMLA or disability paperwork for your recovery, please make sure to either drop it off or have it faxed to our office at [phone]. Be sure the form has your name and date of birth on it.  The form will be faxed to our Forms Department and they will complete it for you.  There is a 25$ fee for all forms that need to be filled out.  Please be aware there is a 10-14 day turnaround time.  You will need to sign a release of information (SONU) form if your paperwork does not come with one.  You may call the Forms Department with any questions at [phone].  Their fax number is [phone].

## 2025-07-15 NOTE — TELEPHONE ENCOUNTER
Faxed received from [employer] regarding PA request for medication, endorsed to nurses bin for review.

## (undated) NOTE — LETTER
[facility] CARE IN [place]  [address] [address]  Dept: [phone]  Dept Fax: [phone]      September 13, 2017    Patient: Cristhian Mary Carmen   Date of Visit: 9/13/2017       To Whom It May Concern:    Piero Jimenez was seen and kathia

## (undated) NOTE — ED AVS SNAPSHOT
[facility] Emergency Department    [address]     [address] [place] [address]    Phone:  [phone]    Fax:  [phone]           Andre Amaya   MRN: [id number]    Department:  [facility] Emergency Department   Date of Visit:  2/12 and Class Registration line at [phone] or find a doctor online by visiting [email or web address].    IF THERE IS ANY CHANGE OR WORSENING OF YOUR CONDITION, CALL YOUR PRIMARY CARE PHYSICIAN AT ONCE OR RETURN IMMEDIATELY TO [address] [place] [address].     If

## (undated) NOTE — ED AVS SNAPSHOT
Farzana Garibay   MRN: [id number]    Department:  [facility] Emergency Department   Date of Visit:  10/17/2018           Disclosure     Insurance plans vary and the physician(s) referred by the ER may not be covered by your plan.  Please contact yo tell this physician (or your personal doctor if your instructions are to return to your personal doctor) about any new or lasting problems. The primary care or specialist physician will see patients referred from the [facility] Emergency Department.  Shelton Lombard

## (undated) NOTE — ED AVS SNAPSHOT
Jasmyn Hager   MRN: [id number]    Department:  [facility] Emergency Department   Date of Visit:  4/23/2018           Disclosure     Insurance plans vary and the physician(s) referred by the ER may not be covered by your plan.  Please contact you tell this physician (or your personal doctor if your instructions are to return to your personal doctor) about any new or lasting problems. The primary care or specialist physician will see patients referred from the [facility] Emergency Department.  Donald Torres

## (undated) NOTE — ED AVS SNAPSHOT
[facility] Emergency Department    [place] [address]    Phone:  [phone]    Fax:  [phone]           Fedegeoffrey Farmer   MRN: [id number]    Department:  [facility] Emergency Department   Date of Visit:  2/12 service to you, we would appreciate any positive or negative feedback related to the care you received in our emergency department. Please call our [address] [facility] at [phone]. Your Emergency Department team is here to serve you.   You are our top priori I certified that I have received a copy of the aftercare instructions; that these instructions have been explained to me; all questions pertaining to these instructions have been answered in a satisfactory manner.         [address] your Zip Code and Date of Birth to complete the sign-up process. If you do not sign up before the expiration date, you must request a new code.     Your unique 3yy game platform Access Code: 2002 Michael Boss  Expires: 4/13/2017  4:37 AM    If you have questions, you can c

## (undated) NOTE — IP AVS SNAPSHOT
[address]            (For Outpatient Use Only) Initial Admit Date: 2022   Inpt/Obs Admit Date: Inpt: 22 / Obs: N/A   Discharge Date:    Vitaly Prow:  [de-identified]   MRN: [de-identified]   CSN: [id number]   CEID: [id number]        ENCOUNTER  Patient Class: Inpatient Admitting Provider: Tiny Tanner MD Unit: [address] [place] [address] Service: Medical Attending Provider: Harvinder Oconnor DO   Bed: 3600-A   Visit Type:   Referring Physician: No ref. provider found Billing Flag:    Admit Diagnosis: MS exacerbation      PATIENT  Legal Name:   Alyson Thao   Legal Sex: Male  Gender ID:              Pref Name:    PCP:  Velasquez Meraz Home: [phone]   Address:  [address] [place] [address] : 1975 (47 yrs) Mobile: [phone]         City/State/Zip: [facility] [address] Marital: Single Language: [address] [place] [address] Drive: Who What Wear N4: [id number] Taoism: [address] Not Crissie Skates*     Race: Other Ethnicity:  Or  [facility] ([facility]   EMERGENCY CONTACT   Name Relationship Legal Guardian? Home Phone Work Phone Mobile Phone   Troy Maxwell  Mother    [phone]     GUARANTOR  Guarantor: Kenya Turpin : 1975 Home Phone: [phone]   Address: [address]  Sex: Male Work Phone:    City/State/Zip: [address] [place] [address], [address] [place] [address]   Rel. to Patient: Self Guarantor ID: [id number]   GUARANTOR EMPLOYER   Employer:  Status: NOT EMPLO*     COVERAGE  PRIMARY INSURANCE   Payor: BLUE CROSS MEDICAID Plan: [facility]*   Group Number: [id number] Insurance Type: [address] Name: Hay Keys : 1975   Subscriber ID: [id number] Pt Rel to Subscriber: Self   SECONDARY INSURANCE   Payor:  Plan:    Group Number:  Insurance Type:    Subscriber Name:  Subscriber :    Subscriber ID:  Pt Rel to Subscriber:    TERTIARY INSURANCE   Payor:  Plan:    Group Number:  Insurance Type:    Subscriber Name:  Subscriber : Subscriber ID:  Pt Rel to Subscriber:    Hospital Account Financial Class: Medicaid Advantage    January 4, 2023

## (undated) NOTE — LETTER
[facility] CARE IN [place]  [address] [address]  Dept: [phone]  Dept Fax: [phone]         September 29, 2017    Patient: Narcisa Arias   YOB: 1975   Date of Visit: 9/29/2017       To Whom It May Giovanna

## (undated) NOTE — LETTER
Date: 9/13/2017    Patient Name: Seth Carlin          To Whom it may concern: This letter has been written at the patient's request. The above patient was seen at the [facility] for treatment of a medical condition.     This patient [place]

## (undated) NOTE — LETTER
October 2, 2017    Patient: Samara Barr   Date of Visit: 9/30/2017       To Whom It May Concern:    Lia Bence was seen and treated in our emergency department on 9/30/2017. He can return to work on 10/3/2017.     If you have any questions or con

## (undated) NOTE — ED AVS SNAPSHOT
Riversidelondon Raines   MRN: [id number]    Department:  [facility] Emergency Department   Date of Visit:  5/28/2019           Disclosure     Insurance plans vary and the physician(s) referred by the ER may not be covered by your plan.  Please contact you tell this physician (or your personal doctor if your instructions are to return to your personal doctor) about any new or lasting problems. The primary care or specialist physician will see patients referred from the [facility] Emergency Department.  Ross Rich

## (undated) NOTE — LETTER
12/12/22    Dear [facility],    We have been unable to reach you by phone. Dr. Humphrey Postal would like you to start a medication by the name of Delmar Must for your MS. Unfortunately, due to your JCV status it is not safe for you to start the Tysabri, which is the medication that we had originally planned to prescribe. The Delmar Must will be an infusion given once every 6 months. To begin, we need you to take the enclosed lab forms to the [facility] that you like to go to and have the labs completed. We also need you to sign the Ocrevus start form and get that back to our office so that we can start completing a benefits investigation through your insurance. Finally, we have enclosed a new Verbal Release form for you to add your mother to your records so that we may speak to her on your behalf. We know you will have many questions and we are here to answer them for you. Please call our office at [phone] option 1 so that we can discuss the [address] [facility] and any other questions you may have. Sincerely,    Sincerely,    RN staff   [facility] [address] [facility], [place], [address]  [phone]  [email or web address]. org

## (undated) NOTE — LETTER
October 3, 2017    Patient: Jasmyn Hager   Date of Visit: 10/2/2017       To Whom It May Concern:    Nadyne Cooks was seen and treated in our emergency department on 10/2/2017. He can return to work on 10/5/2017.     If you have any questions or con

## (undated) NOTE — ED AVS SNAPSHOT
Seth Carlin   MRN: [id number]    Department:  [facility] Emergency Department   Date of Visit:  10/20/2018           Disclosure     Insurance plans vary and the physician(s) referred by the ER may not be covered by your plan.  Please contact yo tell this physician (or your personal doctor if your instructions are to return to your personal doctor) about any new or lasting problems. The primary care or specialist physician will see patients referred from the [facility] Emergency Department.  Donald Torres

## (undated) NOTE — ED AVS SNAPSHOT
Michelle Kathya   MRN: [id number]    Department:  [facility] Emergency Department   Date of Visit:  9/30/2017           Disclosure     Insurance plans vary and the physician(s) referred by the ER may not be covered by your plan.  Please contact you If you have been prescribed any medication(s), please fill your prescription right away and begin taking the medication(s) as directed    If the emergency physician has read X-rays, these will be re-interpreted by a radiologist.  If there is a significant

## (undated) NOTE — LETTER
01/31/23    Mcmahan Buckingham  [address] [place] [address]    ROSARIO Milton and Janis Dwan,    We have been trying to reach you via phone and have been unsuccessful. We have good news to share as the OCREVUS to be infused at the [facility] have been approved. We have ordered the medication through [employer]. Their phone number is [phone]. They have reached out several time to authorize shipment but have not received an answer. You must reach out to [facility] for the medication to be shipped, our office can NOT give this approval for you. It is imperative that you reach out to [place] [address] at the number above and authorize shipment to [facility] [address] Louis Singh [address] [facility], [address] You can ONLY be scheduled at the [address] [place] [address] once they have approval for shipment of the [address]. Again, please call [phone] to give your authorization to ship this medication.     Sincerely,    [facility] [address]